# Patient Record
Sex: MALE | Race: WHITE | NOT HISPANIC OR LATINO | Employment: FULL TIME | URBAN - METROPOLITAN AREA
[De-identification: names, ages, dates, MRNs, and addresses within clinical notes are randomized per-mention and may not be internally consistent; named-entity substitution may affect disease eponyms.]

---

## 2022-12-27 ENCOUNTER — HOSPITAL ENCOUNTER (OUTPATIENT)
Dept: SLEEP MEDICINE | Facility: HOSPITAL | Age: 42
Discharge: HOME | End: 2022-12-27
Attending: PHYSICIAN ASSISTANT
Payer: COMMERCIAL

## 2022-12-27 DIAGNOSIS — G47.33 OBSTRUCTIVE SLEEP APNEA (ADULT) (PEDIATRIC): Primary | ICD-10-CM

## 2022-12-27 PROCEDURE — 95806 SLEEP STUDY UNATT&RESP EFFT: CPT

## 2022-12-29 PROBLEM — G47.33 OBSTRUCTIVE SLEEP APNEA (ADULT) (PEDIATRIC): Status: ACTIVE | Noted: 2022-12-29

## 2023-01-30 ENCOUNTER — HOSPITAL ENCOUNTER (OUTPATIENT)
Dept: SLEEP MEDICINE | Facility: HOSPITAL | Age: 43
Discharge: HOME | End: 2023-01-30
Attending: PHYSICIAN ASSISTANT
Payer: COMMERCIAL

## 2023-01-30 DIAGNOSIS — G47.33 OBSTRUCTIVE SLEEP APNEA (ADULT) (PEDIATRIC): Primary | ICD-10-CM

## 2023-01-30 PROCEDURE — 95811 POLYSOM 6/>YRS CPAP 4/> PARM: CPT

## 2024-08-26 ENCOUNTER — CLINICAL SUPPORT (OUTPATIENT)
Dept: FAMILY MEDICINE | Facility: CLINIC | Age: 44
End: 2024-08-26
Payer: COMMERCIAL

## 2024-08-26 ENCOUNTER — OFFICE VISIT (OUTPATIENT)
Dept: FAMILY MEDICINE | Facility: CLINIC | Age: 44
End: 2024-08-26
Payer: COMMERCIAL

## 2024-08-26 ENCOUNTER — TELEPHONE (OUTPATIENT)
Dept: FAMILY MEDICINE | Facility: CLINIC | Age: 44
End: 2024-08-26

## 2024-08-26 VITALS
HEIGHT: 67 IN | HEART RATE: 78 BPM | SYSTOLIC BLOOD PRESSURE: 118 MMHG | BODY MASS INDEX: 34.16 KG/M2 | RESPIRATION RATE: 20 BRPM | DIASTOLIC BLOOD PRESSURE: 74 MMHG | WEIGHT: 217.63 LBS | OXYGEN SATURATION: 95 %

## 2024-08-26 DIAGNOSIS — E78.2 MIXED HYPERLIPIDEMIA: ICD-10-CM

## 2024-08-26 DIAGNOSIS — Z00.00 PREVENTATIVE HEALTH CARE: ICD-10-CM

## 2024-08-26 DIAGNOSIS — R11.0 NAUSEA: ICD-10-CM

## 2024-08-26 DIAGNOSIS — E11.9 TYPE 2 DIABETES MELLITUS WITHOUT COMPLICATION, WITHOUT LONG-TERM CURRENT USE OF INSULIN: ICD-10-CM

## 2024-08-26 DIAGNOSIS — M54.2 CERVICALGIA: ICD-10-CM

## 2024-08-26 DIAGNOSIS — G47.33 OSA (OBSTRUCTIVE SLEEP APNEA): ICD-10-CM

## 2024-08-26 DIAGNOSIS — F32.A DEPRESSION, UNSPECIFIED DEPRESSION TYPE: ICD-10-CM

## 2024-08-26 DIAGNOSIS — F17.200 SMOKER: ICD-10-CM

## 2024-08-26 DIAGNOSIS — Z12.11 SCREEN FOR COLON CANCER: ICD-10-CM

## 2024-08-26 DIAGNOSIS — M19.90 ARTHRITIS: ICD-10-CM

## 2024-08-26 DIAGNOSIS — I10 ESSENTIAL HYPERTENSION: ICD-10-CM

## 2024-08-26 DIAGNOSIS — I10 ESSENTIAL HYPERTENSION: Primary | ICD-10-CM

## 2024-08-26 LAB
25(OH)D3+25(OH)D2 SERPL-MCNC: 24 NG/ML (ref 30–96)
ALBUMIN SERPL BCP-MCNC: 4.1 G/DL (ref 3.5–5.2)
ALP SERPL-CCNC: 77 U/L (ref 55–135)
ALT SERPL W/O P-5'-P-CCNC: 55 U/L (ref 10–44)
ANION GAP SERPL CALC-SCNC: 9 MMOL/L (ref 8–16)
AST SERPL-CCNC: 28 U/L (ref 10–40)
BASOPHILS # BLD AUTO: 0.09 K/UL (ref 0–0.2)
BASOPHILS NFR BLD: 1.2 % (ref 0–1.9)
BILIRUB SERPL-MCNC: 0.5 MG/DL (ref 0.1–1)
BUN SERPL-MCNC: 14 MG/DL (ref 6–20)
CALCIUM SERPL-MCNC: 9.2 MG/DL (ref 8.7–10.5)
CHLORIDE SERPL-SCNC: 107 MMOL/L (ref 95–110)
CHOLEST SERPL-MCNC: 210 MG/DL (ref 120–199)
CHOLEST/HDLC SERPL: 5 {RATIO} (ref 2–5)
CO2 SERPL-SCNC: 24 MMOL/L (ref 23–29)
CREAT SERPL-MCNC: 0.9 MG/DL (ref 0.5–1.4)
DIFFERENTIAL METHOD BLD: ABNORMAL
EOSINOPHIL # BLD AUTO: 0.6 K/UL (ref 0–0.5)
EOSINOPHIL NFR BLD: 7.1 % (ref 0–8)
ERYTHROCYTE [DISTWIDTH] IN BLOOD BY AUTOMATED COUNT: 13.2 % (ref 11.5–14.5)
EST. GFR  (NO RACE VARIABLE): >60 ML/MIN/1.73 M^2
ESTIMATED AVG GLUCOSE: 151 MG/DL (ref 68–131)
GLUCOSE SERPL-MCNC: 128 MG/DL (ref 70–110)
HBA1C MFR BLD: 6.9 % (ref 4–5.6)
HCT VFR BLD AUTO: 45 % (ref 40–54)
HDLC SERPL-MCNC: 42 MG/DL (ref 40–75)
HDLC SERPL: 20 % (ref 20–50)
HGB BLD-MCNC: 15.5 G/DL (ref 14–18)
IMM GRANULOCYTES # BLD AUTO: 0.02 K/UL (ref 0–0.04)
IMM GRANULOCYTES NFR BLD AUTO: 0.3 % (ref 0–0.5)
LDLC SERPL CALC-MCNC: 138.2 MG/DL (ref 63–159)
LYMPHOCYTES # BLD AUTO: 2.8 K/UL (ref 1–4.8)
LYMPHOCYTES NFR BLD: 36.4 % (ref 18–48)
MCH RBC QN AUTO: 31.6 PG (ref 27–31)
MCHC RBC AUTO-ENTMCNC: 34.4 G/DL (ref 32–36)
MCV RBC AUTO: 92 FL (ref 82–98)
MONOCYTES # BLD AUTO: 0.6 K/UL (ref 0.3–1)
MONOCYTES NFR BLD: 7.4 % (ref 4–15)
NEUTROPHILS # BLD AUTO: 3.7 K/UL (ref 1.8–7.7)
NEUTROPHILS NFR BLD: 47.6 % (ref 38–73)
NONHDLC SERPL-MCNC: 168 MG/DL
NRBC BLD-RTO: 0 /100 WBC
PLATELET # BLD AUTO: 214 K/UL (ref 150–450)
PMV BLD AUTO: 9.9 FL (ref 9.2–12.9)
POTASSIUM SERPL-SCNC: 4.3 MMOL/L (ref 3.5–5.1)
PROT SERPL-MCNC: 7 G/DL (ref 6–8.4)
RBC # BLD AUTO: 4.91 M/UL (ref 4.6–6.2)
SODIUM SERPL-SCNC: 140 MMOL/L (ref 136–145)
TRIGL SERPL-MCNC: 149 MG/DL (ref 30–150)
TSH SERPL DL<=0.005 MIU/L-ACNC: 1.03 UIU/ML (ref 0.4–4)
WBC # BLD AUTO: 7.72 K/UL (ref 3.9–12.7)

## 2024-08-26 PROCEDURE — 1159F MED LIST DOCD IN RCRD: CPT | Mod: CPTII,S$GLB,, | Performed by: FAMILY MEDICINE

## 2024-08-26 PROCEDURE — 4010F ACE/ARB THERAPY RXD/TAKEN: CPT | Mod: CPTII,S$GLB,, | Performed by: FAMILY MEDICINE

## 2024-08-26 PROCEDURE — 84443 ASSAY THYROID STIM HORMONE: CPT | Performed by: FAMILY MEDICINE

## 2024-08-26 PROCEDURE — 3008F BODY MASS INDEX DOCD: CPT | Mod: CPTII,S$GLB,, | Performed by: FAMILY MEDICINE

## 2024-08-26 PROCEDURE — 1160F RVW MEDS BY RX/DR IN RCRD: CPT | Mod: CPTII,S$GLB,, | Performed by: FAMILY MEDICINE

## 2024-08-26 PROCEDURE — 36415 COLL VENOUS BLD VENIPUNCTURE: CPT | Mod: S$GLB,,, | Performed by: FAMILY MEDICINE

## 2024-08-26 PROCEDURE — 83036 HEMOGLOBIN GLYCOSYLATED A1C: CPT | Performed by: FAMILY MEDICINE

## 2024-08-26 PROCEDURE — 82306 VITAMIN D 25 HYDROXY: CPT | Performed by: FAMILY MEDICINE

## 2024-08-26 PROCEDURE — 80061 LIPID PANEL: CPT | Performed by: FAMILY MEDICINE

## 2024-08-26 PROCEDURE — 99999 PR PBB SHADOW E&M-EST. PATIENT-LVL IV: CPT | Mod: PBBFAC,,, | Performed by: FAMILY MEDICINE

## 2024-08-26 PROCEDURE — 99204 OFFICE O/P NEW MOD 45 MIN: CPT | Mod: S$GLB,,, | Performed by: FAMILY MEDICINE

## 2024-08-26 PROCEDURE — 85025 COMPLETE CBC W/AUTO DIFF WBC: CPT | Performed by: FAMILY MEDICINE

## 2024-08-26 PROCEDURE — 3078F DIAST BP <80 MM HG: CPT | Mod: CPTII,S$GLB,, | Performed by: FAMILY MEDICINE

## 2024-08-26 PROCEDURE — G2211 COMPLEX E/M VISIT ADD ON: HCPCS | Mod: S$GLB,,, | Performed by: FAMILY MEDICINE

## 2024-08-26 PROCEDURE — 80053 COMPREHEN METABOLIC PANEL: CPT | Performed by: FAMILY MEDICINE

## 2024-08-26 PROCEDURE — 3074F SYST BP LT 130 MM HG: CPT | Mod: CPTII,S$GLB,, | Performed by: FAMILY MEDICINE

## 2024-08-26 RX ORDER — PANTOPRAZOLE SODIUM 40 MG/1
40 TABLET, DELAYED RELEASE ORAL DAILY
Qty: 30 TABLET | Refills: 5 | Status: SHIPPED | OUTPATIENT
Start: 2024-08-26 | End: 2025-08-26

## 2024-08-26 RX ORDER — FLUOXETINE HYDROCHLORIDE 20 MG/1
1 CAPSULE ORAL DAILY
COMMUNITY
Start: 2022-07-12

## 2024-08-26 RX ORDER — LISINOPRIL 20 MG/1
1 TABLET ORAL DAILY
COMMUNITY
Start: 2022-07-12

## 2024-08-26 RX ORDER — ONDANSETRON 4 MG/1
TABLET, ORALLY DISINTEGRATING ORAL
COMMUNITY

## 2024-08-26 RX ORDER — ROSUVASTATIN CALCIUM 20 MG/1
1 TABLET, COATED ORAL NIGHTLY
COMMUNITY
Start: 2023-04-26 | End: 2024-08-26 | Stop reason: SDUPTHER

## 2024-08-26 RX ORDER — DICLOFENAC SODIUM 75 MG/1
75 TABLET, DELAYED RELEASE ORAL 2 TIMES DAILY
Qty: 60 TABLET | Refills: 1 | Status: SHIPPED | OUTPATIENT
Start: 2024-08-26

## 2024-08-26 RX ORDER — AMLODIPINE BESYLATE 2.5 MG/1
1 TABLET ORAL DAILY
COMMUNITY

## 2024-08-26 RX ORDER — ROSUVASTATIN CALCIUM 20 MG/1
20 TABLET, COATED ORAL NIGHTLY
Qty: 90 TABLET | Refills: 1 | Status: SHIPPED | OUTPATIENT
Start: 2024-08-26

## 2024-08-26 NOTE — PROGRESS NOTES
"Subjective:       Patient ID: Dimitrios Cazares is a 44 y.o. male.    Chief Complaint: Establish Care (Pt here to [facility]. )    Pt is a 44 y.o. male who presents for evaluation and management of   Encounter Diagnoses   Name Primary?    Essential hypertension Yes    Type 2 diabetes mellitus without complication, without long-term current use of insulin     Mixed hyperlipidemia     Depression, unspecified depression type     Smoker     Preventative health care     Nausea     ANDRE (obstructive sleep apnea)     Arthritis    .  Doing well on current meds. Denies any side effects. Prevention is up to date.  Review of Systems   Constitutional:  Negative for fever.   HENT:  Positive for postnasal drip.    Respiratory:  Positive for apnea. Negative for shortness of breath.         Has auto cpap (this is new) but is having issues and is still snoring on it   He previously had a bipap with pressure of 13/5      Cardiovascular:  Negative for chest pain.   Gastrointestinal:  Positive for nausea. Negative for anal bleeding and blood in stool.        Nausea for about a week   HAs some "indigestion" better with TUMS      Genitourinary:  Negative for dysuria.   Musculoskeletal:  Positive for arthralgias and neck pain.        Bilateral knee pain   Comes and goes     Bilateral groin pain intermittently     C3/4 and C5/6 disc disease on MRI per pt.      Neurological:  Positive for numbness and headaches. Negative for dizziness and light-headedness.        CTS bilaterally---- braces at night help      Psychiatric/Behavioral:  Negative for dysphoric mood and sleep disturbance. The patient is not nervous/anxious.        Objective:      Physical Exam  Constitutional:       Appearance: Normal appearance. He is well-developed. He is obese. He is not ill-appearing.   HENT:      Head: Normocephalic and atraumatic.      Right Ear: External ear normal.      Left Ear: External ear normal.      Nose: Nose normal.      Mouth/Throat:      Mouth: " Mucous membranes are moist.      Pharynx: No oropharyngeal exudate or posterior oropharyngeal erythema.   Eyes:      General: No scleral icterus.        Right eye: No discharge.         Left eye: No discharge.      Conjunctiva/sclera: Conjunctivae normal.      Pupils: Pupils are equal, round, and reactive to light.   Neck:      Thyroid: No thyromegaly.      Vascular: No JVD.      Trachea: No tracheal deviation.   Cardiovascular:      Rate and Rhythm: Normal rate and regular rhythm.      Heart sounds: Normal heart sounds. No murmur heard.  Pulmonary:      Effort: Pulmonary effort is normal. No respiratory distress.      Breath sounds: Normal breath sounds. No wheezing or rales.   Chest:      Chest wall: No tenderness.   Abdominal:      General: Bowel sounds are normal. There is no distension.      Palpations: Abdomen is soft. There is no mass.      Tenderness: There is no abdominal tenderness. There is no guarding or rebound.   Genitourinary:     Comments: Has an early left inguinal hernia...   Musculoskeletal:         General: Normal range of motion.      Cervical back: Normal range of motion and neck supple.      Right lower leg: No edema.      Left lower leg: No edema.   Lymphadenopathy:      Cervical: No cervical adenopathy.   Skin:     General: Skin is warm and dry.   Neurological:      Mental Status: He is alert and oriented to person, place, and time.      Cranial Nerves: No cranial nerve deficit.      Motor: No abnormal muscle tone.      Coordination: Coordination normal.      Deep Tendon Reflexes: Reflexes are normal and symmetric. Reflexes normal.   Psychiatric:         Behavior: Behavior normal.         Thought Content: Thought content normal.         Judgment: Judgment normal.         Assessment:       1. Essential hypertension    2. Type 2 diabetes mellitus without complication, without long-term current use of insulin    3. Mixed hyperlipidemia    4. Depression, unspecified depression type    5. Smoker     6. Preventative health care    7. Nausea    8. ANDRE (obstructive sleep apnea)    9. Arthritis        Plan:   1. Essential hypertension  -     CBC Auto Differential; Future; Expected date: 08/26/2024  -     Comprehensive Metabolic Panel; Future; Expected date: 08/26/2024  -     TSH; Future; Expected date: 08/26/2024    2. Type 2 diabetes mellitus without complication, without long-term current use of insulin  -     rosuvastatin (CRESTOR) 20 MG tablet; Take 1 tablet (20 mg total) by mouth every evening.  Dispense: 90 tablet; Refill: 1  -     Hemoglobin A1C; Future; Expected date: 08/26/2024  -     TSH; Future; Expected date: 08/26/2024    3. Mixed hyperlipidemia  -     rosuvastatin (CRESTOR) 20 MG tablet; Take 1 tablet (20 mg total) by mouth every evening.  Dispense: 90 tablet; Refill: 1  -     Lipid Panel; Future; Expected date: 08/26/2024  -     TSH; Future; Expected date: 08/26/2024    4. Depression, unspecified depression type  Overview:  Mostly anger   Prozac working well         5. Smoker  Overview:  Smoked since about 19   Quit, sort of, a few years ago, but he still smokes about 3 times a week. 1 pack lasts him about 2 weeks             6. Preventative health care  -     Vitamin D; Future; Expected date: 08/26/2024    7. Nausea  -     pantoprazole (PROTONIX) 40 MG tablet; Take 1 tablet (40 mg total) by mouth once daily.  Dispense: 30 tablet; Refill: 5    8. ANDRE (obstructive sleep apnea)  -     CPAP FOR HOME USE    9. Arthritis  -     diclofenac (VOLTAREN) 75 MG EC tablet; Take 1 tablet (75 mg total) by mouth 2 (two) times daily.  Dispense: 60 tablet; Refill: 1      Colonoscopy---This procedure has been fully reviewed with the patient and written informed consent has been obtained.     Adding PPI     RTC 3-4 weeks       No follow-ups on file.

## 2024-08-26 NOTE — TELEPHONE ENCOUNTER
Cpap order with recommended settings and clinical notes faxed to [facility] per pt's request at [phone]. Fax confirmation received.

## 2024-08-27 DIAGNOSIS — E11.9 TYPE 2 DIABETES MELLITUS WITHOUT COMPLICATION, WITHOUT LONG-TERM CURRENT USE OF INSULIN: Primary | ICD-10-CM

## 2024-08-27 RX ORDER — LANCETS
EACH MISCELLANEOUS
Qty: 100 EACH | Refills: 11 | Status: SHIPPED | OUTPATIENT
Start: 2024-08-27

## 2024-08-27 RX ORDER — METFORMIN HYDROCHLORIDE 500 MG/1
500 TABLET ORAL
Qty: 30 TABLET | Refills: 5 | Status: SHIPPED | OUTPATIENT
Start: 2024-08-27 | End: 2025-08-27

## 2024-08-27 RX ORDER — INSULIN PUMP SYRINGE, 3 ML
EACH MISCELLANEOUS
Qty: 1 EACH | Refills: 0 | Status: SHIPPED | OUTPATIENT
Start: 2024-08-27

## 2024-08-27 NOTE — PROGRESS NOTES
Start vit D OTC [id number] IUS daily for low vit D   Im starting metformin 500mg daily for his DM   I am also referring him for DM education for new diabetes   Will get him a glucometer, but Ihe does not have to check his blood sugar daily. Only a couple of times a week to see where he is and prn hypoglycemia symptoms

## 2024-08-29 ENCOUNTER — HOSPITAL ENCOUNTER (OUTPATIENT)
Dept: RADIOLOGY | Facility: HOSPITAL | Age: 44
Discharge: HOME | End: 2024-08-29
Attending: ANESTHESIOLOGY
Payer: COMMERCIAL

## 2024-08-29 ENCOUNTER — OFFICE VISIT (OUTPATIENT)
Dept: PAIN MEDICINE | Facility: CLINIC | Age: 44
End: 2024-08-29
Payer: COMMERCIAL

## 2024-08-29 DIAGNOSIS — M54.2 CHRONIC NECK PAIN: ICD-10-CM

## 2024-08-29 DIAGNOSIS — M54.2 CERVICALGIA: ICD-10-CM

## 2024-08-29 DIAGNOSIS — G89.29 CHRONIC NECK PAIN: ICD-10-CM

## 2024-08-29 DIAGNOSIS — M47.812 ARTHROPATHY OF CERVICAL FACET JOINT: ICD-10-CM

## 2024-08-29 DIAGNOSIS — M25.78 DEGENERATION OF INTERVERTEBRAL DISC OF CERVICAL REGION WITH OSTEOPHYTE OF CERVICAL VERTEBRA: ICD-10-CM

## 2024-08-29 DIAGNOSIS — M54.12 CERVICAL RADICULITIS: Primary | ICD-10-CM

## 2024-08-29 DIAGNOSIS — M50.30 DEGENERATION OF INTERVERTEBRAL DISC OF CERVICAL REGION WITH OSTEOPHYTE OF CERVICAL VERTEBRA: ICD-10-CM

## 2024-08-29 PROCEDURE — 72050 X-RAY EXAM NECK SPINE 4/5VWS: CPT | Mod: 26,,, | Performed by: RADIOLOGY

## 2024-08-29 PROCEDURE — 99999 PR PBB SHADOW E&M-EST. PATIENT-LVL III: CPT | Mod: PBBFAC,,, | Performed by: ANESTHESIOLOGY

## 2024-08-29 PROCEDURE — 72050 X-RAY EXAM NECK SPINE 4/5VWS: CPT | Mod: TC

## 2024-08-29 RX ORDER — GABAPENTIN 300 MG/1
300 CAPSULE ORAL 3 TIMES DAILY
Qty: 90 CAPSULE | Refills: 0 | Status: SHIPPED | OUTPATIENT
Start: 2024-08-29

## 2024-08-29 NOTE — PROGRESS NOTES
New patient evaluation  [facility]    Dimitrios Cazares  : 1980  Date: 2024     CHIEF COMPLAINT:  No chief complaint on file.    Referring Physician: Self, Aaareferral  Primary Care Physician: Geronimo Vera MD    HPI:  This is a 44 y.o. male with a chief complaint of No chief complaint on file.  . The patient has Past medical history/Past surgical history of ***    Patient was evaluated and referred by ***    Diabetic: {GAYes/No/NA:58903}    {Anticogualtion drugs:44370}    Allergy To Iodine: {GAYes/No/NA:09128}    Currently on Antibiotic: {GAYes/No/NA:53618}    Current Description of Pain Symptoms:    History of Recent Fall or Trauma: {GAYes/No/NA:31677}   Onset: Chronic, started ***  Pain Location: ***  Radiates/associated symptoms: ***.   Pain is Getting worse over the last *** months    The pain is described as {Desc; pain character:66042}.   Exacerbating factors: {Causes; Pain:07577}.   Mitigating factors ***.   Symptoms interfere with daily activity, sleeping, and ***.   The patient feels like symptoms have been {IUW:77896}.   Patient {Denies / Reports:99650} {RED FLAGS:09876}.    Pain score:   Current: {PAIN 0-10:98988}/10  Best: {PAIN 0-10:44896}/10  Worst: {PAIN 0-10:93796}/10    Current pain medications:    Current Outpatient Medications:     albuterol (PROVENTIL) 2.5 mg /3 mL (0.083 %) nebulizer solution, daily as needed. Pt taking prn, Disp: , Rfl: 0    amLODIPine (NORVASC) 2.5 MG tablet, Take 1 tablet by mouth once daily., Disp: , Rfl:     blood sugar diagnostic (BLOOD GLUCOSE TEST) Strp, Check blood sugar daily prn, Disp: 60 strip, Rfl: 11    blood-glucose meter kit, Check blood sugar daily prn, Disp: 1 each, Rfl: 0    diclofenac (VOLTAREN) 75 MG EC tablet, Take 1 tablet (75 mg total) by mouth 2 (two) times daily., Disp: 60 tablet, Rfl: 1    FLUoxetine 20 MG capsule, Take 1 capsule by mouth once daily., Disp: , Rfl:     lancets Misc, Check blood sugar  daily prn, Disp: 100 each, Rfl: 11    lisinopriL (PRINIVIL,ZESTRIL) 20 MG tablet, Take 1 tablet by mouth once daily., Disp: , Rfl:     metFORMIN (GLUCOPHAGE) 500 MG tablet, Take 1 tablet (500 mg total) by mouth daily with breakfast., Disp: 30 tablet, Rfl: 5    ondansetron (ZOFRAN-ODT) 4 MG TbDL, , Disp: , Rfl:     pantoprazole (PROTONIX) 40 MG tablet, Take 1 tablet (40 mg total) by mouth once daily., Disp: 30 tablet, Rfl: 5    rosuvastatin (CRESTOR) 20 MG tablet, Take 1 tablet (20 mg total) by mouth every evening., Disp: 90 tablet, Rfl: 1    Current Narcotics/Opioid /benzo Medications:  Opioids- {GAopioid:61773}  Benzodiazepines: {GAYes/No/NA:41282}    UDS:  NA    PDMP:  {:34194}     Previous Chronic Pain Treatment History:  Physical Therapy/HEP/Physician Lead Exercise Program:  Over the past 12 months, Patient has done  *** sessions.  PT response: {PT response:18289} Helpful.   Dates of the PT sessions: ***, ***.  Is patient participating in home exercise program (HEP)/ physician led exercise program: {GAYes/No/NA:12485}.    Non-interventional Pain Therapy:  []Chiropractor.   []Acupuncture/Dry needle.  []TENS unit.  []Heat/ICE.  []Back Brace.    Medications previously tried:  NSAIDs: {GANSIAD:11761}  Topical Agent: {GAYes/No/NA:09377}  TCA/SSRI/SNRI: {GATCA/SSRI/SNRI:84214}  Anti-convulsants: {GAAnticonvulsants:53100}  Muscle Relaxants: {GAmuscle Relaxant:36960}  Opioids- {GAopioid:85721}.    Interventional Pain Procedures:  ***    Previous spine/Relevant joint surgery:  ***  Surgical History:   has a past surgical history that includes Gallbladder surgery (08/20/2019); Hernia repair (2003); Abdominal hernia repair (08/20/2019); and Pace tooth extraction (1997).  Medical History:   has a past medical history of Carpal tunnel syndrome on both sides and Hypertension.  Family History:  family history is not on file.  Allergies:  Aspirin   Social History/SUBSTANCE ABUSE HISTORY:  Personal history of substance  "abuse: No   reports that he has been smoking cigarettes. He has been exposed to tobacco smoke. He has never used smokeless tobacco. He reports current alcohol use. He reports that he does not use drugs.  LABS:  CBC  Lab Results   Component Value Date    WBC 7.72 08/26/2024    HGB 15.5 08/26/2024    HCT 45.0 08/26/2024     Coagulation Profile   Lab Results   Component Value Date     08/26/2024     No results found for: "PT", "PTT", "INR"  CMP:  BMP  Lab Results   Component Value Date     08/26/2024    K 4.3 08/26/2024     08/26/2024    CO2 24 08/26/2024    BUN 14 08/26/2024    CREATININE 0.9 08/26/2024    CALCIUM 9.2 08/26/2024    ANIONGAP 9 08/26/2024    EGFRNORACEVR >60 08/26/2024     Lab Results   Component Value Date    ALT 55 (H) 08/26/2024    AST 28 08/26/2024    ALKPHOS 77 08/26/2024    BILITOT 0.5 08/26/2024     HGBA1C:  Lab Results   Component Value Date    HGBA1C 6.9 (H) 08/26/2024       ROS:    Review of Systems   GENERAL:  No weight loss, malaise or fevers.  HEENT:   No recent changes in vision or hearing  NECK:  Negative for lumps, no difficulty with swallowing.  RESPIRATORY:  Negative for cough, wheezing or shortness of breath, patient denies any recent URI.  CARDIOVASCULAR:  Negative for chest pain or palpitations.  GI:  Negative for abdominal discomfort, blood in stools or black stools or change in bowel habits.  MUSCULOSKELETAL:  See HPI.  SKIN:  Negative for lesions, rash, and itching.  PSYCH:  No mood disorder or recent psychosocial stressors.   HEMATOLOGY/LYMPHOLOGY:  See the blood thinner sectioned in HPI.  NEURO:  See HPI  All other reviewed and negative other than HPI.    PHYSICAL EXAM:  VITALS: There were no vitals taken for this visit.  There is no height or weight on file to calculate BMI.  GENERAL: Well appearing, in no acute distress, alert and oriented x3, answers questions appropriately.   PSYCH: Flat affect.  SKIN: Skin color, texture, turgor normal, no rashes or " lesions.  HEAD/FACE:  Normocephalic, atraumatic. Cranial nerves grossly intact.  CV: Regular rate  PULM: No evidence of respiratory difficulty, symmetric chest rise.  GI:  Soft and non-Distended.  NECK: (***) pain to palpation over the cervical paraspinous muscles. Spurling: ***. (***) pain with neck flexion, extension, or lateral flexion, Muscle strength in RT UE ***/5 and Left UE ***/5, Hand  5/5 bilaterally   BACK/SIJ/HIP:  Lumbar Spine Exam:       Inspection: No erythema, bruising.       Palpation: (***) TTP of lumbar paraspinals bilaterally      ROM:  Limited in flexion, extension, lateral bending.       (***) Facet loading bilaterally      (***) Straight Leg Raise bilaterally      (***) ILANA bilaterally, Tenderness over the PSIS, Yeoman test  Hip Exam:      Inspection: No gross deformity or apparent leg length discrepancy      Palpation:  No TTP to bilateral greater trochanteric bursas.       ROM:  No limitation or pain in internal rotation, external rotation b/l  Neurologic Exam:     Alert. Speech is fluent and appropriate.     Strength: ***/5 in *** hip flexion and knee extension     Sensation:  Grossly intact to light touch in bilateral lower extremities     Tone: No abnormality appreciated in bilateral lower extremities  Knee exam:  No gross deformity or apparent leg length discrepancy, positive tenderness over the anteromedial aspect of the knee cap, positive limitation due to pain in flexion and extension, sensation caudal grossly intact to light touch in bilateral lower extremity, no atrophy or tone abnormalities    GAIT: ***    DIAGNOSTIC STUDIES AND MEDICAL RECORDS REVIEW:  I have personally reviewed and interpreted relevant radiology reports and reviewed relevant records from other services in the EMR.   ***  Clinical Impression:  This is a pleasant 44 y.o. male patient with PMH/PSH of ***, presenting with***.     We discussed the underlying diagnoses and multiple treatment options including  "non-opioid medications, interventional procedures, physical therapy, home exercise, core muscle enhancement, and weight loss.  The risks and benefits of each treatment option were discussed and all questions were answered.      Encounter Diagnosis:  Dimitrios Cazares is a 44 y.o. male with the following diagnoses based on history, exam, and imaging:  There are no diagnoses linked to this encounter.   ---------------------------------------------------------------------      Treatment Plan:    Diagnostics/Referrals: {gaimage:19651}    Medications:    NSAIDs: {GANSIAD:60162}  Topical Agent: {GAYes/No/NA:97610}  TCA/SSRI/SNRI: {GATCA/SSRI/SNRI:57330}  Anti-convulsants: {GAAnticonvulsants:12450}  Muscle Relaxants: {GAmuscle Relaxant:30559}  Opioids: {GAopioid:37137::"None"}  Patient was educated about the risk and benefit of chronic opioid therapy including dependency, addiction, diversion, and opioid hyperalgesia.  Interventional Therapy: {GAProcedure:97372}.  Sedation: {GAsedation:10160}.   {Anticogualtion drugs:76372}-Clearance to stop Blood thinner:***     Regarding the above interventions, the patient has been educated regarding the risks (including bleeding, infection, increased pain, nerve damage, or allergic reaction), benefits, and alternatives. The patient states he understands and is eager to proceed.    Physical Rehabilitation: {GAPT:73364}    Patient Education: Counseled patient regarding the importance of {:96749}, I have stressed the importance of physical activity and a home exercise plan to help with pain and improve health.    Follow-up: RTC ***.    May consider:       Corbin Pickering MD  Anesthesiologist  Interventional Pain Medicine  08/29/2024    Disclaimer:  This note was prepared using voice recognition system and is likely to have sound alike errors that may have been overlooked even after proof reading.  Please call me with any questions.  "

## 2024-08-29 NOTE — PROGRESS NOTES
New patient evaluation  [facility]    Dimitrios Cazares  : 1980  Date: 2024     CHIEF COMPLAINT:  Neck Pain    Referring Physician: Self, Aaareferral  Primary Care Physician: Geronimo Vera MD    HPI:  This is a 44 y.o. male with a chief complaint of Neck Pain  . The patient has Past medical history/Past surgical history of  hypertension, hyperlipidemia, diabetes, sleep apnea, smoker    Patient was evaluated and referred by PCP for Neck pain.  He had previous cervical epidural steroid injection outside [facility] with moderate improvement in pain and functionality in .  MRI cervical spine was completed  outside [facility].    Diabetic: Yes    Anticogualtion drugs: None    Allergy To Iodine: No    Currently on Antibiotic: No    Current Description of Pain Symptoms:    History of Recent Fall or Trauma: No   Onset: Chronic, started 1 yr  Pain Location: neck and CH  Radiates/associated symptoms: bilat shoulder blades, no significant bilateral upper extremity radiation   Bilateral  hand numbness due to carpal tunnel  Pain is Getting worse over the last 3 months    The pain is described as aching, burning, sharp, shooting, stabbing, and throbbing.   Exacerbating factors:  neck extension  Mitigating factors none.   Symptoms interfere with daily activity, sleeping.   The patient feels like symptoms have been worsening.   Patient denies night fever/night sweats, urinary incontinence, bowel incontinence, significant weight loss, significant motor weakness, and loss of sensations.    Pain score:  Current: 2/10  Best: 0/10  Worst: 7/10    Current pain medications:      diclofenac (VOLTAREN) 75 MG EC tablet BID    FLUoxetine 20 MG capsule daily     Current Narcotics/Opioid /benzo Medications:  Opioids- None  Benzodiazepines: No    UDS:  NA    PDMP:  Reviewed and consistent with medication use as prescribed.     Previous Chronic Pain Treatment History:  Physical Therapy/HEP/Physician  "Lead Exercise Program:  Over the past 12 months, Patient has done  6+ sessions.  PT response: Moderately Helpful.   Dates of the PT sessions: 2023.  Is patient participating in home exercise program (HEP)/ physician led exercise program: Yes.    Non-interventional Pain Therapy:  []Chiropractor.   [x]Dry needle: helps  []TENS unit.  []Heat/ICE.  []Back Brace.    Medications previously tried:  NSAIDs: Diclofenac  Topical Agent: Yes  TCA/SSRI/SNRI: SSRI: Fluoxetine (Prozac)  Anti-convulsants: None  Muscle Relaxants: Flexeril (Cyclobenzaprine)  Opioids- None.    Interventional Pain Procedures:  SHIRA at C7-T1 2022 [facility] made the pain tolerable     Previous spine/Relevant joint surgery:  none  Surgical History:   has a past surgical history that includes Gallbladder surgery (08/20/2019); Hernia repair (2003); Abdominal hernia repair (08/20/2019); and [place] tooth extraction (1997).  Medical History:   has a past medical history of Carpal tunnel syndrome on both sides and Hypertension.  Family History:  family history is not on file.  Allergies:  Aspirin   Social History/SUBSTANCE ABUSE HISTORY:  Personal history of substance abuse: No   reports that he has been smoking cigarettes. He has been exposed to tobacco smoke. He has never used smokeless tobacco. He reports current alcohol use. He reports that he does not use drugs.  LABS:  CBC  Lab Results   Component Value Date    WBC 7.72 08/26/2024    HGB 15.5 08/26/2024    HCT 45.0 08/26/2024     Coagulation Profile   Lab Results   Component Value Date     08/26/2024     No results found for: "PT", "PTT", "INR"  CMP:  BMP  Lab Results   Component Value Date     08/26/2024    K 4.3 08/26/2024     08/26/2024    CO2 24 08/26/2024    BUN 14 08/26/2024    CREATININE 0.9 08/26/2024    CALCIUM 9.2 08/26/2024    ANIONGAP 9 08/26/2024    EGFRNORACEVR >60 08/26/2024     Lab Results   Component Value Date    ALT 55 (H) 08/26/2024    AST 28 08/26/2024    ALKPHOS " 77 08/26/2024    BILITOT 0.5 08/26/2024     HGBA1C:  Lab Results   Component Value Date    HGBA1C 6.9 (H) 08/26/2024       ROS:    Review of Systems   GENERAL:  No weight loss, malaise or fevers.  HEENT:   No recent changes in vision or hearing  NECK:  Negative for lumps, no difficulty with swallowing.  RESPIRATORY:  Negative for cough, wheezing or shortness of breath, patient denies any recent URI.  CARDIOVASCULAR:  Negative for chest pain or palpitations.  GI:  Negative for abdominal discomfort, blood in stools or black stools or change in bowel habits.  MUSCULOSKELETAL:  See HPI.  SKIN:  Negative for lesions, rash, and itching.  PSYCH:  No mood disorder or recent psychosocial stressors.   HEMATOLOGY/LYMPHOLOGY:  See the blood thinner sectioned in HPI.  NEURO:  See HPI  All other reviewed and negative other than HPI.    PHYSICAL EXAM:  VITALS: There were no vitals taken for this visit.  There is no height or weight on file to calculate BMI.  GENERAL: Well appearing, in no acute distress, alert and oriented x3, answers questions appropriately.   PSYCH: Flat affect.  SKIN: Skin color, texture, turgor normal, no rashes or lesions.  HEAD/FACE:  Normocephalic, atraumatic. Cranial nerves grossly intact.  CV: Regular rate  PULM: No evidence of respiratory difficulty, symmetric chest rise.  GI:  Soft and non-Distended.  NECK: ( mild positive) pain to palpation over the cervical paraspinous muscles. Spurling: +. (+) pain with neck extension than flexion, Muscle strength in RT UE 5/5 and Left UE 5/5, Hand  5/5 bilaterally   SHOULDERS: No gross deformity or atrophy noted.   ROM: Full AROM bilaterally. No focal TTP   Rotator cuff exam NARANJO: Unremarkable bilaterally.   EMPTY CAN SIGN: Unremarkable bilaterally.   CROSS-ARM ADDUCTION: No significant AC joint pain bilaterally.  GAIT:   normal    DIAGNOSTIC STUDIES AND MEDICAL RECORDS REVIEW:  I have personally reviewed and interpreted relevant radiology reports and  reviewed relevant records from other services in the EMR.   X-ray cervical spine 2024  after office visit   Mild reversal the normal cervical lordosis. Vertebral body heights are maintained. No abnormal movement with flexion or extension to suggest ligamentous instability. There is disc space narrowing with endplate sclerosis and marginal osteophytosis at the C4-5, C5-6 and C6-7 levels. No evidence of lytic or blastic lesions. Soft tissues are unremarkable.     Clinical Impression:  This is a pleasant 44 y.o. male patient with PMH/PSH of hypertension, hyperlipidemia, diabetes, sleep apnea, smoker, presenting with Neck pain, cervicogenic headache, bilateral shoulder pain, no upper extremity radiculitis, no weakness, no bilateral shoulder pathology, has a MRI cervical spine outside [facility], had good pain relief from cervical epidural steroid injection ,  interested to proceed with injection,  he completed more than 6 weeks of physician led exercise program/ home exercise in the last 6 months.    Encounter Diagnosis:  Dimitrios Cazares is a 44 y.o. male with the following diagnoses based on history, exam, and imagin. Cervical radiculitis    2. Cervicalgia  - Ambulatory referral/consult to Pain Clinic  - X-Ray Cervical Spine AP Lat with Flex Ex; Future    3. Arthropathy of cervical facet joint    4. Degeneration of intervertebral disc of cervical region with osteophyte of cervical vertebra    5. Chronic neck pain  -------------------------------  Treatment Plan:    Diagnostics/Referrals:  records and MRI images from outside    Medications:    NSAIDs: Diclofenac  Topical Agent: NA  TCA/SSRI/SNRI: SSRI: Fluoxetine (Prozac)  Anti-convulsants:  start gabapentin 300 mg 3 times a day, titration, 30 days  Muscle Relaxants: None  Opioids: None    Interventional Therapy:  may consider proceeding with cervical epidural steroid injection after reviewing outside records.  Sedation: Oral Sedation.   Anticogualtion  drugs: None-Clearance to stop Blood thinner: NA     Regarding the above interventions, the patient has been educated regarding the risks (including bleeding, infection, increased pain, nerve damage, or allergic reaction), benefits, and alternatives. The patient states he understands and is eager to proceed.    Physical Rehabilitation: Physician led exercise program and home exercise    Patient Education: Counseled patient regarding the importance of activity modification, I have stressed the importance of physical activity and a home exercise plan to help with pain and improve health.    Follow-up: RTC  after injection.    May consider:   upper level cervical medial branch block.      Corbin Pickering MD  Anesthesiologist  Interventional Pain Medicine  08/29/2024    Disclaimer:  This note was prepared using voice recognition system and is likely to have sound alike errors that may have been overlooked even after proof reading.  Please call me with any questions.

## 2024-09-17 ENCOUNTER — TELEPHONE (OUTPATIENT)
Dept: PAIN MEDICINE | Facility: CLINIC | Age: 44
End: 2024-09-17
Payer: COMMERCIAL

## 2024-09-17 DIAGNOSIS — M54.12 CERVICAL RADICULITIS: Primary | ICD-10-CM

## 2024-09-17 NOTE — TELEPHONE ENCOUNTER
----- Message from Randa Bowling LPN sent at 9/16/2024  4:45 PM CDT -----    ----- Message -----  From: Lidia Oviedo RN  Sent: 9/16/2024   4:20 PM CDT  To: [facility] Pain Management Clinical Support      ----- Message -----  From: Corbin Pickering MD  Sent: 9/16/2024  11:15 AM CDT  To: [facility] Staff     Please schedule for cervical epidural steroid injection at C7-T1, oral sedation,  I need to see MRI cervical spine images before proceeding   Diabetic, not on blood thinners, oral sedation

## 2024-09-19 ENCOUNTER — OFFICE VISIT (OUTPATIENT)
Dept: FAMILY MEDICINE | Facility: CLINIC | Age: 44
End: 2024-09-19
Payer: COMMERCIAL

## 2024-09-19 VITALS
BODY MASS INDEX: 34.07 KG/M2 | HEART RATE: 84 BPM | RESPIRATION RATE: 18 BRPM | OXYGEN SATURATION: 96 % | SYSTOLIC BLOOD PRESSURE: 126 MMHG | HEIGHT: 67 IN | WEIGHT: 217.06 LBS | DIASTOLIC BLOOD PRESSURE: 80 MMHG

## 2024-09-19 DIAGNOSIS — G56.02 CARPAL TUNNEL SYNDROME OF LEFT WRIST: ICD-10-CM

## 2024-09-19 DIAGNOSIS — F32.A DEPRESSION, UNSPECIFIED DEPRESSION TYPE: ICD-10-CM

## 2024-09-19 DIAGNOSIS — R11.0 NAUSEA: ICD-10-CM

## 2024-09-19 DIAGNOSIS — I10 ESSENTIAL HYPERTENSION: Primary | ICD-10-CM

## 2024-09-19 DIAGNOSIS — E11.9 TYPE 2 DIABETES MELLITUS WITHOUT COMPLICATION, WITHOUT LONG-TERM CURRENT USE OF INSULIN: ICD-10-CM

## 2024-09-19 PROCEDURE — 99999 PR PBB SHADOW E&M-EST. PATIENT-LVL III: CPT | Mod: PBBFAC,,, | Performed by: FAMILY MEDICINE

## 2024-09-19 RX ORDER — METFORMIN HYDROCHLORIDE 500 MG/1
500 TABLET ORAL
Qty: 90 TABLET | Refills: 1 | Status: SHIPPED | OUTPATIENT
Start: 2024-09-19 | End: 2025-09-19

## 2024-09-19 RX ORDER — FLUOXETINE HYDROCHLORIDE 20 MG/1
20 CAPSULE ORAL DAILY
Qty: 90 CAPSULE | Refills: 2 | Status: SHIPPED | OUTPATIENT
Start: 2024-09-19

## 2024-09-19 RX ORDER — LISINOPRIL 20 MG/1
20 TABLET ORAL DAILY
Qty: 90 TABLET | Refills: 2 | Status: SHIPPED | OUTPATIENT
Start: 2024-09-19

## 2024-09-19 RX ORDER — TRIAMCINOLONE ACETONIDE 40 MG/ML
20 INJECTION, SUSPENSION INTRA-ARTICULAR; INTRAMUSCULAR
Status: COMPLETED | OUTPATIENT
Start: 2024-09-19 | End: 2024-09-19

## 2024-09-19 RX ORDER — PANTOPRAZOLE SODIUM 40 MG/1
40 TABLET, DELAYED RELEASE ORAL DAILY
Qty: 90 TABLET | Refills: 1 | Status: SHIPPED | OUTPATIENT
Start: 2024-09-19 | End: 2025-09-19

## 2024-09-19 RX ADMIN — TRIAMCINOLONE ACETONIDE 20 MG: 40 INJECTION, SUSPENSION INTRA-ARTICULAR; INTRAMUSCULAR at 01:09

## 2024-09-19 NOTE — PROGRESS NOTES
Subjective:       Patient ID: Dimitrios Cazares is a 44 y.o. male.    Chief Complaint: Follow-up (Pt here for f/u. )    Pt is a 44 y.o. male who presents for evaluation and management of   Encounter Diagnoses   Name Primary?    Type 2 diabetes mellitus without complication, without long-term current use of insulin     Nausea     Essential hypertension Yes    Depression, unspecified depression type     Carpal tunnel syndrome of left wrist    .nausea better with PPI. Resolved   Labs with new onset DM     Doing well on current meds. Denies any side effects. Prevention is up to date.  Review of Systems   Respiratory:  Negative for shortness of breath.    Cardiovascular:  Negative for chest pain.   Gastrointestinal:  Negative for abdominal pain and nausea.   Neurological:  Positive for numbness.        Bilateral CTS         Objective:      Physical Exam  Constitutional:       Appearance: Normal appearance. He is well-developed. He is not ill-appearing.   HENT:      Head: Normocephalic and atraumatic.      Right Ear: External ear normal.      Left Ear: External ear normal.      Nose: Nose normal.      Mouth/Throat:      Mouth: Mucous membranes are moist.      Pharynx: No oropharyngeal exudate or posterior oropharyngeal erythema.   Eyes:      General: No scleral icterus.        Right eye: No discharge.         Left eye: No discharge.      Conjunctiva/sclera: Conjunctivae normal.      Pupils: Pupils are equal, round, and reactive to light.   Neck:      Thyroid: No thyromegaly.      Vascular: No JVD.      Trachea: No tracheal deviation.   Cardiovascular:      Rate and Rhythm: Normal rate and regular rhythm.      Heart sounds: Normal heart sounds. No murmur heard.  Pulmonary:      Effort: Pulmonary effort is normal. No respiratory distress.      Breath sounds: Normal breath sounds. No wheezing or rales.   Chest:      Chest wall: No tenderness.   Abdominal:      General: Bowel sounds are normal. There is no distension.       Palpations: Abdomen is soft. There is no mass.      Tenderness: There is no abdominal tenderness. There is no guarding or rebound.   Musculoskeletal:         General: Normal range of motion.      Cervical back: Normal range of motion and neck supple.      Right lower leg: No edema.      Left lower leg: No edema.      Comments: Pos phalen      Lymphadenopathy:      Cervical: No cervical adenopathy.   Skin:     General: Skin is warm and dry.   Neurological:      Mental Status: He is alert and oriented to person, place, and time.      Cranial Nerves: No cranial nerve deficit.      Motor: No abnormal muscle tone.      Coordination: Coordination normal.      Deep Tendon Reflexes: Reflexes are normal and symmetric. Reflexes normal.   Psychiatric:         Behavior: Behavior normal.         Thought Content: Thought content normal.         Judgment: Judgment normal.         Assessment:       1. Essential hypertension    2. Type 2 diabetes mellitus without complication, without long-term current use of insulin    3. Nausea    4. Depression, unspecified depression type    5. Carpal tunnel syndrome of left wrist        Plan:   1. Essential hypertension  -     lisinopriL (PRINIVIL,ZESTRIL) 20 MG tablet; Take 1 tablet (20 mg total) by mouth once daily.  Dispense: 90 tablet; Refill: 2    2. Type 2 diabetes mellitus without complication, without long-term current use of insulin  -     metFORMIN (GLUCOPHAGE) 500 MG tablet; Take 1 tablet (500 mg total) by mouth daily with breakfast.  Dispense: 90 tablet; Refill: 1    3. Nausea  -     pantoprazole (PROTONIX) 40 MG tablet; Take 1 tablet (40 mg total) by mouth once daily.  Dispense: 90 tablet; Refill: 1    4. Depression, unspecified depression type  Overview:  Mostly anger   Prozac working well       Orders:  -     FLUoxetine 20 MG capsule; Take 1 capsule (20 mg total) by mouth once daily.  Dispense: 90 capsule; Refill: 2    5. Carpal tunnel syndrome of left wrist  -     triamcinolone  acetonide injection 20 mg     Area prepped and draped in sterile fashion. Using anterior approach, left wrist(carpal tunnel) injected with kenalog 60mg and lidocaine 1.5ml. Pt atif well with good relief    No follow-ups on file.

## 2024-09-25 ENCOUNTER — PATIENT MESSAGE (OUTPATIENT)
Dept: FAMILY MEDICINE | Facility: CLINIC | Age: 44
End: 2024-09-25
Payer: COMMERCIAL

## 2024-09-25 DIAGNOSIS — E11.9 TYPE 2 DIABETES MELLITUS WITHOUT COMPLICATION: ICD-10-CM

## 2024-09-25 DIAGNOSIS — E11.9 TYPE 2 DIABETES MELLITUS WITHOUT COMPLICATION, UNSPECIFIED WHETHER LONG TERM INSULIN USE: ICD-10-CM

## 2024-09-25 RX ORDER — AMLODIPINE BESYLATE 2.5 MG/1
2.5 TABLET ORAL DAILY
Qty: 90 TABLET | Refills: 2 | Status: SHIPPED | OUTPATIENT
Start: 2024-09-25

## 2024-09-25 NOTE — TELEPHONE ENCOUNTER
LOV 09/19/2024    patient requesting refill for amLODIPine (NORVASC) 2.5 MG tablet         RX pending   pharmacy confirmed  please advise

## 2024-09-25 NOTE — TELEPHONE ENCOUNTER
No care due was identified.  [facility] Embedded Care Due Messages. Reference number: [id number].   9/25/2024 9:39:37 AM CDT

## 2024-09-26 RX ORDER — AMLODIPINE BESYLATE 2.5 MG/1
2.5 TABLET ORAL DAILY
Qty: 90 TABLET | Refills: 2 | OUTPATIENT
Start: 2024-09-26

## 2024-09-26 NOTE — TELEPHONE ENCOUNTER
No care due was identified.  [facility] Embedded Care Due Messages. Reference number: [id number].   9/25/2024 7:45:32 PM CDT

## 2024-10-04 ENCOUNTER — HOSPITAL ENCOUNTER (OUTPATIENT)
Dept: RADIOLOGY | Facility: HOSPITAL | Age: 44
Discharge: HOME | End: 2024-10-04
Attending: ANESTHESIOLOGY | Admitting: ANESTHESIOLOGY
Payer: COMMERCIAL

## 2024-10-04 ENCOUNTER — HOSPITAL ENCOUNTER (OUTPATIENT)
Facility: HOSPITAL | Age: 44
Discharge: HOME | End: 2024-10-04
Attending: ANESTHESIOLOGY | Admitting: ANESTHESIOLOGY
Payer: COMMERCIAL

## 2024-10-04 VITALS
SYSTOLIC BLOOD PRESSURE: 120 MMHG | TEMPERATURE: 97 F | HEART RATE: 66 BPM | OXYGEN SATURATION: 95 % | DIASTOLIC BLOOD PRESSURE: 70 MMHG | RESPIRATION RATE: 16 BRPM

## 2024-10-04 DIAGNOSIS — M54.12 CERVICAL RADICULITIS: ICD-10-CM

## 2024-10-04 DIAGNOSIS — F41.9 ANXIETY: ICD-10-CM

## 2024-10-04 DIAGNOSIS — M54.12 CERVICAL RADICULOPATHY: Primary | ICD-10-CM

## 2024-10-04 DIAGNOSIS — G89.29 CHRONIC PAIN: ICD-10-CM

## 2024-10-04 PROCEDURE — 76000 FLUOROSCOPY <1 HR PHYS/QHP: CPT | Mod: TC

## 2024-10-04 PROCEDURE — 63600175 PHARM REV CODE 636 W HCPCS: Performed by: ANESTHESIOLOGY

## 2024-10-04 PROCEDURE — 25500020 PHARM REV CODE 255: Performed by: ANESTHESIOLOGY

## 2024-10-04 PROCEDURE — 62321 NJX INTERLAMINAR CRV/THRC: CPT | Performed by: ANESTHESIOLOGY

## 2024-10-04 PROCEDURE — 25000003 PHARM REV CODE 250: Performed by: ANESTHESIOLOGY

## 2024-10-04 PROCEDURE — 62321 NJX INTERLAMINAR CRV/THRC: CPT | Mod: ,,, | Performed by: ANESTHESIOLOGY

## 2024-10-04 RX ORDER — LIDOCAINE HYDROCHLORIDE 20 MG/ML
INJECTION, SOLUTION INFILTRATION; PERINEURAL
Status: DISCONTINUED | OUTPATIENT
Start: 2024-10-04 | End: 2024-10-04 | Stop reason: HOSPADM

## 2024-10-04 RX ORDER — LIDOCAINE HYDROCHLORIDE 10 MG/ML
INJECTION, SOLUTION EPIDURAL; INFILTRATION; INTRACAUDAL; PERINEURAL
Status: DISCONTINUED | OUTPATIENT
Start: 2024-10-04 | End: 2024-10-04 | Stop reason: HOSPADM

## 2024-10-04 RX ORDER — DEXAMETHASONE SODIUM PHOSPHATE 10 MG/ML
INJECTION INTRAMUSCULAR; INTRAVENOUS
Status: DISCONTINUED | OUTPATIENT
Start: 2024-10-04 | End: 2024-10-04 | Stop reason: HOSPADM

## 2024-10-04 RX ORDER — ALPRAZOLAM 0.25 MG/1
0.5 TABLET, ORALLY DISINTEGRATING ORAL
Status: DISCONTINUED | OUTPATIENT
Start: 2024-10-04 | End: 2024-10-04 | Stop reason: HOSPADM

## 2024-10-04 RX ADMIN — ALPRAZOLAM 0.5 MG: 0.25 TABLET, ORALLY DISINTEGRATING ORAL at 11:10

## 2024-10-04 NOTE — OP NOTE
Cervical Interlaminar Epidural Steroid Injection under Fluoroscopic Guidance    The procedure, risks, benefits, and options were discussed with the patient. There are no contraindications to the procedure. The patent expressed understanding and agreed to the procedure. Informed written consent was obtained prior to the start of the procedure and can be found in the patient's chart.     PATIENT NAME: Dimitrios Cazares   MRN: [id number]     DATE OF PROCEDURE: 10/04/2024    PROCEDURE: Cervical Interlaminar Epidural Steroid Injection C7/T1 under Fluoroscopic Guidance    PRE-OP DIAGNOSIS: Cervical radiculitis [M54.12] Cervical radiculopathy [M54.12]    POST-OP DIAGNOSIS: Same    PHYSICIAN: Corbin Pickering MD   MEDICATIONS INJECTED: Preservative-free Decadron 10mg with 1cc of Lidocaine 1% MPF and preservative free normal saline    LOCAL ANESTHETIC INJECTED: Xylocaine 2%     SEDATION: oral    ESTIMATED BLOOD LOSS: None    COMPLICATIONS: None    TECHNIQUE: Time-out was performed to identify the patient and procedure to be performed. With the patient laying in a prone position, the surgical area was prepped and draped in the usual sterile fashion using ChloraPrep and a fenestrated drape. The level was determined under fluoroscopy guidance. Skin anesthesia was achieved by injecting Lidocaine 2% over the injection site.  The interlaminar space was then approached with a 20 gauge, 3.5 inch Tuohy needle that was introduced under fluoroscopic guidance with AP, lateral and/or contralateral oblique imaging. Once the Ligamentum flavum was encountered loss of resistance to saline was used to enter the epidural space. With positive loss of resistance and negative aspiration for CSF or Blood, contrast dye  Omnipaque (300mg/mL) was injected to confirm placement and there was no vascular runoff. Then 5 mL of the medication mixture listed above was then injected slowly. Displacement of the radio opaque contrast after injection of the medication  confirmed that the medication went into the area of the epidural space. The needles were removed, and bleeding was nil. A sterile dressing was applied. No specimens collected. The patient tolerated the procedure well.       The patient was monitored after the procedure in the recovery area. They were given post-procedure and discharge instructions to follow at home. The patient was discharged in a stable condition.    Corbin Pickering MD

## 2024-10-04 NOTE — DISCHARGE SUMMARY
Discharge Note  Short Stay    Admit Date: 10/4/2024    Attending Physician: Corbin Pickering    Discharge Physician: Corbin Pickering    Discharge Date: 10/4/2024 12:09 PM    Procedure(s) (LRB):  CERVICAL EPIDURAL STEROID INJECTION (C7-T1) (ORAL SEDATION) (N/A)    Final Diagnosis: Cervical radiculitis [M54.12]    Disposition: Home or self care    Patient Instructions:   Current Discharge Medication List        CONTINUE these medications which have NOT CHANGED    Details   albuterol (PROVENTIL) 2.5 mg /3 mL (0.083 %) nebulizer solution daily as needed. Pt taking prn  Refills: 0      amLODIPine (NORVASC) 2.5 MG tablet Take 1 tablet (2.5 mg total) by mouth once daily.  Qty: 90 tablet, Refills: 2    Comments: .      FLUoxetine 20 MG capsule Take 1 capsule (20 mg total) by mouth once daily.  Qty: 90 capsule, Refills: 2    Associated Diagnoses: Depression, unspecified depression type      gabapentin (NEURONTIN) 300 MG capsule Take 1 capsule (300 mg total) by mouth 3 (three) times daily.  Qty: 90 capsule, Refills: 0      lisinopriL (PRINIVIL,ZESTRIL) 20 MG tablet Take 1 tablet (20 mg total) by mouth once daily.  Qty: 90 tablet, Refills: 2    Comments: .  Associated Diagnoses: Essential hypertension      metFORMIN (GLUCOPHAGE) 500 MG tablet Take 1 tablet (500 mg total) by mouth daily with breakfast.  Qty: 90 tablet, Refills: 1    Associated Diagnoses: Type 2 diabetes mellitus without complication, without long-term current use of insulin      pantoprazole (PROTONIX) 40 MG tablet Take 1 tablet (40 mg total) by mouth once daily.  Qty: 90 tablet, Refills: 1    Associated Diagnoses: Nausea      rosuvastatin (CRESTOR) 20 MG tablet Take 1 tablet (20 mg total) by mouth every evening.  Qty: 90 tablet, Refills: 1    Associated Diagnoses: Type 2 diabetes mellitus without complication, without long-term current use of insulin; Mixed hyperlipidemia      blood sugar diagnostic (BLOOD GLUCOSE TEST) Strp Check blood sugar daily prn  Qty: 60  strip, Refills: 11    Associated Diagnoses: Type 2 diabetes mellitus without complication, without long-term current use of insulin      blood-glucose meter kit Check blood sugar daily prn  Qty: 1 each, Refills: 0    Associated Diagnoses: Type 2 diabetes mellitus without complication, without long-term current use of insulin      diclofenac (VOLTAREN) 75 MG EC tablet Take 1 tablet (75 mg total) by mouth 2 (two) times daily.  Qty: 60 tablet, Refills: 1    Associated Diagnoses: Arthritis      lancets Misc Check blood sugar daily prn  Qty: 100 each, Refills: 11    Associated Diagnoses: Type 2 diabetes mellitus without complication, without long-term current use of insulin      ondansetron (ZOFRAN-ODT) 4 MG TbDL              Discharge Diagnosis: Cervical radiculitis [M54.12]  Condition on Discharge: Stable with no complications to procedure   Diet on Discharge: Same as before.  Activity: as per instruction sheet.  Discharge to: Home with a responsible adult.  Follow up: 2-4 weeks       Please call my office or pager at [phone] if experienced any weakness or loss of sensation, fever > 101.5, pain uncontrolled with oral medications, persistent nausea/vomiting/or diarrhea, redness or drainage from the incisions, or any other worrisome concerns. If physician on call was not reached or could not communicate with our office for any reason please go to the nearest emergency department.     Corbin Pickering  10/04/2024

## 2024-10-04 NOTE — PLAN OF CARE
Dc instructions given to pt. Voiced compliance and understanding. Dc'd home in stable condition.

## 2024-10-04 NOTE — DISCHARGE INSTRUCTIONS
DIET: You may resume your normal diet today.    BATHING: You may resume your normal bathing.          You may shower, no hot water directly on site for 24 hours.    DRESSING: You may remove your bandage today.    ACTIVITY LEVEL: You may resume your normal activities 24 hours after your  procedure.    If you have received sedation or an anesthetic, you may feel sleepy  for several hours. Rest until you are more awake. Gradually resume your normal activities tomorrow.    If you have received sedation or an anesthetic, do not drive or operate heavy machinery for at least 24 hours.    MEDICATION: You may resume your normal medications today.    You will receive instructions for any pain prescriptions. Pain medications should be taken only as directed.    SPECIAL INSTRUCTIONS: No heat to the injection site for 24 hours including: bath or shower, heating pad, moist heat, hot tubs.    Use ice pack to injection site for any pain or discomfort. Apply ice pack to 20 minutes then remove for 20 minutes before re-applying to site.    WHEN TO CALL DOCTOR: Redness or swelling around injection site    Fever of 101F    Drainage (pus) from the injection site    For any continuous bleeding (some dried blood over the incision is normal).    FOLLOW UP: Follow up phone call will be made by office.        Diet on Discharge: Same as before.  Activity: as per instruction sheet.  Discharge to: Home with a responsible adult.  Follow up: 2-4 weeks        Please call my office or pager at [phone] if experienced any weakness or loss of sensation, fever > 101.5, pain uncontrolled with oral medications, persistent nausea/vomiting/or diarrhea, redness or drainage from the incisions, or any other worrisome concerns. If physician on call was not reached or could not communicate with our office for any reason please go to the nearest emergency department.      Corbin Pickering

## 2024-10-04 NOTE — H&P
HPI  Patient presenting for Procedure(s) (LRB):  CERVICAL EPIDURAL STEROID INJECTION (C7-T1) (ORAL SEDATION) (N/A)     Patient on Anti-coagulation No    Patient is not on antibiotic for the last 2 weeks    Patient has a Ride Home assistance.    No health changes since previous encounter    Past Medical History:   Diagnosis Date    Carpal tunnel syndrome on both sides     Hypertension      Past Surgical History:   Procedure Laterality Date    ABDOMINAL HERNIA REPAIR  08/20/2019    GALLBLADDER SURGERY  08/20/2019    HERNIA REPAIR  2003    WISDOM TOOTH EXTRACTION  1997     Review of patient's allergies indicates:   Allergen Reactions    Aspirin Other (See Comments)     Mitral valve prolapse. Heart doctor does not allow patient to take      No current facility-administered medications for this encounter.       PMHx, PSHx, Allergies, Medications reviewed in epic    ROS negative except pain complaints in HPI    OBJECTIVE:    There were no vitals taken for this visit.    PHYSICAL EXAMINATION:    GENERAL: Well appearing, in no acute distress, alert and oriented x3.  PSYCH:  Mood and affect appropriate.  SKIN: Skin color, texture, turgor normal, no rashes or lesions which will impact the procedure.  CV: RRR with palpation of the radial artery.  PULM: No evidence of respiratory difficulty, symmetric chest rise. Clear to auscultation.  NEURO: Cranial nerves grossly intact.    Plan:    Proceed with procedure as planned Procedure(s) (LRB):  CERVICAL EPIDURAL STEROID INJECTION (C7-T1) (ORAL SEDATION) (N/A)    Corbin Pickering  Interventional Pain  10/04/2024

## 2024-10-14 ENCOUNTER — HOSPITAL ENCOUNTER (OUTPATIENT)
Dept: PULMONOLOGY | Facility: HOSPITAL | Age: 44
Discharge: HOME | End: 2024-10-14
Attending: FAMILY MEDICINE
Payer: COMMERCIAL

## 2024-10-14 ENCOUNTER — HOSPITAL ENCOUNTER (OUTPATIENT)
Dept: PREADMISSION TESTING | Facility: HOSPITAL | Age: 44
Discharge: HOME | End: 2024-10-14
Attending: FAMILY MEDICINE
Payer: COMMERCIAL

## 2024-10-14 DIAGNOSIS — Z01.818 PREOP TESTING: ICD-10-CM

## 2024-10-14 PROCEDURE — 93005 ELECTROCARDIOGRAM TRACING: CPT

## 2024-10-14 PROCEDURE — 93010 ELECTROCARDIOGRAM REPORT: CPT | Mod: ,,, | Performed by: INTERNAL MEDICINE

## 2024-10-14 NOTE — DISCHARGE INSTRUCTIONS
Colonoscopy Prep Instructions      Date: Tuesday 10/22/24   Arrival time:       IMPORTANT: PLEASE READ YOUR INSTRUCTIONS CAREFULLY. FAILURE TO FOLLOW THESE INSTRUCTIONS MAY RESULT IN YOUR PROCEURE BEING CANCELED,RESCHEDULED, OR REPEATED.            Clear Liquid Diet     MONDAY: The day before your procedure you will follow a clear liquid diet ALL day.     You may have any of the following:     Water, tea, coffee (decaffeinated or regular)     Soft drinks (regular or sugar free)     Gelatin dessert (plain or flavored)     Juice, Gatorade, Powerade, Crystal Lite, lemonade, limeade, Adi-Aid     Bouillon, clear consommé, 100% fat free beef, chicken, or vegetable broths     Snowballs, popsicles     100% cranberry juice is the only red liquid allowed     Please Avoid the following:     Anything with RED dye     Liquids not specifically listed     Dairy (liquid and powder)     Creamers (liquid and powder)     Alcohol            Suprep Instructions:   Please disregard the Suprep insert/box instructions from pharmacy.         The day before your procedure: MONDAY    Upon awakening begin the clear liquid diet. DO NOT EAT SOLID FOODS     Drink at least 6-8 glasses of clear liquids until you begin your prep     You may mix your prep Monday morning     At 11:00 am: Take four (4) Dulcolax tablets (Bisacodyl) with 8 ounces of clear liquids.     At 2:00 pm: Pour one 6-ounce bottle of Suprep into the mixing container                          Add water to the 16-ounce line on the container and mix                Drink all the mixed prep plus 2 more 16-ounce containers of clear liquid within 2 hours    It is common to experience abdominal cramping, nausea, and vomiting when taking the prep. If you have nausea and/or vomiting while taking the prep, stop drinking for 20-30 minutes then resume.      You may continue with the clear liquids after this step.     At 6:00 pm          Pour one 6-ounce bottle of Suprep into the mixing  container             Add water to the 16-ounce line on the container and mix             Drink all the mixed prep plus 2 more 16-ounce containers of clear liquid within 2 hours    Once you are done with your prep, you can continue to have clear liquids through the night until 4am.    Before arrival, you will hold all morning medications except ______________ with a sip of water.    YOU MUST HAVE A  HOME

## 2024-10-15 LAB
OHS QRS DURATION: 98 MS
OHS QTC CALCULATION: 417 MS

## 2024-10-15 RX ORDER — GABAPENTIN 300 MG/1
300 CAPSULE ORAL 3 TIMES DAILY
Qty: 90 CAPSULE | Refills: 0 | Status: SHIPPED | OUTPATIENT
Start: 2024-10-15

## 2024-10-16 ENCOUNTER — PATIENT OUTREACH (OUTPATIENT)
Dept: ADMINISTRATIVE | Facility: HOSPITAL | Age: 44
End: 2024-10-16
Payer: COMMERCIAL

## 2024-10-16 NOTE — PROGRESS NOTES
[facility] Exam Report.  Chart reviewed, immunization record updated.  No new results noted on [facility] or [facility] web site.  Care Everywhere updated.   Patient care coordination note  Upcoming PCP visit updated.  Next PCP visit 3/27/2025 and lab visit on 3/19/2025.  Discussed eye exam with patient, patient states he will schedule with eye doctor, declined eyecam.

## 2024-10-21 ENCOUNTER — PATIENT MESSAGE (OUTPATIENT)
Dept: ADMINISTRATIVE | Facility: HOSPITAL | Age: 44
End: 2024-10-21
Payer: COMMERCIAL

## 2024-10-31 ENCOUNTER — OFFICE VISIT (OUTPATIENT)
Dept: PAIN MEDICINE | Facility: CLINIC | Age: 44
End: 2024-10-31
Payer: COMMERCIAL

## 2024-10-31 VITALS — BODY MASS INDEX: 34.58 KG/M2 | WEIGHT: 217.5 LBS

## 2024-10-31 DIAGNOSIS — G89.29 CHRONIC NECK PAIN: ICD-10-CM

## 2024-10-31 DIAGNOSIS — M54.2 CHRONIC NECK PAIN: ICD-10-CM

## 2024-10-31 DIAGNOSIS — M47.812 ARTHROPATHY OF CERVICAL FACET JOINT: ICD-10-CM

## 2024-10-31 DIAGNOSIS — M54.12 CERVICAL RADICULITIS: Primary | ICD-10-CM

## 2024-10-31 PROCEDURE — 99999 PR PBB SHADOW E&M-EST. PATIENT-LVL III: CPT | Mod: PBBFAC,,, | Performed by: ANESTHESIOLOGY

## 2024-10-31 RX ORDER — GABAPENTIN 300 MG/1
300 CAPSULE ORAL 2 TIMES DAILY
Qty: 60 CAPSULE | Refills: 11 | Status: SHIPPED | OUTPATIENT
Start: 2024-10-31 | End: 2025-10-31

## 2024-11-04 ENCOUNTER — TELEPHONE (OUTPATIENT)
Dept: PHARMACY | Facility: CLINIC | Age: 44
End: 2024-11-04
Payer: COMMERCIAL

## 2024-11-04 NOTE — TELEPHONE ENCOUNTER
[facility]/Population Health Chart Review & Patient Outreach Details For Medication Adherence Project    Reason for Outreach Encounter: 3rd Party payor non-compliance report ([employer], [employer], [employer], etc)  2.  Patient Outreach Method: Reviewed patient chart   3.   Medication in question: rosuvastatin     rosuvastatin  last filled  8/26/24 for 90 day supply    4.  Reviewed and or Updates Made To: Patient Chart  5. Outreach Outcomes and/or actions taken: Patient filled medication and is on track to be adherent  Additional Notes:

## 2024-11-07 DIAGNOSIS — M19.90 ARTHRITIS: ICD-10-CM

## 2024-11-07 RX ORDER — DICLOFENAC SODIUM 75 MG/1
75 TABLET, DELAYED RELEASE ORAL 2 TIMES DAILY
Qty: 60 TABLET | Refills: 1 | Status: SHIPPED | OUTPATIENT
Start: 2024-11-07

## 2024-11-07 NOTE — TELEPHONE ENCOUNTER
No care due was identified.  Health [facility] Embedded Care Due Messages. Reference number: [id number].   11/07/2024 6:58:20 AM CST

## 2024-11-07 NOTE — TELEPHONE ENCOUNTER
LOV 09/19/2024    patient requesting refill for diclofenac (VOLTAREN) 75 MG EC tablet         RX pending   pharmacy confirmed  please advise

## 2024-11-12 LAB
LEFT EYE DM RETINOPATHY: NEGATIVE
RIGHT EYE DM RETINOPATHY: NEGATIVE

## 2024-11-26 ENCOUNTER — ANESTHESIA (OUTPATIENT)
Dept: ENDOSCOPY | Facility: HOSPITAL | Age: 44
End: 2024-11-26
Payer: COMMERCIAL

## 2024-11-26 ENCOUNTER — ANESTHESIA EVENT (OUTPATIENT)
Dept: ENDOSCOPY | Facility: HOSPITAL | Age: 44
End: 2024-11-26
Payer: COMMERCIAL

## 2024-11-26 ENCOUNTER — HOSPITAL ENCOUNTER (OUTPATIENT)
Dept: ENDOSCOPY | Facility: HOSPITAL | Age: 44
Discharge: HOME | End: 2024-11-26
Attending: STUDENT IN AN ORGANIZED HEALTH CARE EDUCATION/TRAINING PROGRAM
Payer: COMMERCIAL

## 2024-11-26 DIAGNOSIS — Z12.11 SCREEN FOR COLON CANCER: ICD-10-CM

## 2024-11-26 PROCEDURE — 37000009 HC ANESTHESIA EA ADD 15 MINS

## 2024-11-26 PROCEDURE — 37000008 HC ANESTHESIA 1ST 15 MINUTES

## 2024-11-26 PROCEDURE — G0121 COLON CA SCRN NOT HI RSK IND: HCPCS | Performed by: FAMILY MEDICINE

## 2024-11-26 PROCEDURE — 45378 DIAGNOSTIC COLONOSCOPY: CPT | Mod: ,,, | Performed by: FAMILY MEDICINE

## 2024-11-26 PROCEDURE — 63600175 PHARM REV CODE 636 W HCPCS: Performed by: NURSE ANESTHETIST, CERTIFIED REGISTERED

## 2024-11-26 RX ORDER — LIDOCAINE HYDROCHLORIDE 20 MG/ML
INJECTION, SOLUTION EPIDURAL; INFILTRATION; INTRACAUDAL; PERINEURAL
Status: DISCONTINUED | OUTPATIENT
Start: 2024-11-26 | End: 2024-11-26

## 2024-11-26 RX ORDER — PROPOFOL 10 MG/ML
VIAL (ML) INTRAVENOUS
Status: DISCONTINUED | OUTPATIENT
Start: 2024-11-26 | End: 2024-11-26

## 2024-11-26 RX ADMIN — PROPOFOL 100 MG: 10 INJECTION, EMULSION INTRAVENOUS at 08:11

## 2024-11-26 RX ADMIN — LIDOCAINE HYDROCHLORIDE 50 MG: 20 INJECTION, SOLUTION EPIDURAL; INFILTRATION; INTRACAUDAL; PERINEURAL at 08:11

## 2024-11-26 NOTE — ANESTHESIA POSTPROCEDURE EVALUATION
Anesthesia Post Evaluation    Patient: Dimitrios Cazares    Procedure(s) Performed: * No procedures listed *    Final Anesthesia Type: general      Patient location during evaluation: PACU  Patient participation: Yes- Able to Participate  Level of consciousness: awake and alert, oriented and awake  Post-procedure vital signs: reviewed and stable  Pain management: adequate  Airway patency: patent    PONV status at discharge: No PONV  Anesthetic complications: no      Cardiovascular status: blood pressure returned to baseline, hemodynamically stable and stable  Respiratory status: unassisted, spontaneous ventilation and room air  Hydration status: euvolemic  Follow-up not needed.              Vitals Value Taken Time   /66 11/26/24 0920   Temp 37 11/26/24 0930   Pulse 82 11/26/24 0920   Resp 16 11/26/24 0920   SpO2 94 % 11/26/24 0920         Event Time   Out of Recovery 09:30:05         Pain/Carlos Manuel Score: Carlos Manuel Score: 10 (11/26/2024  9:14 AM)

## 2024-11-26 NOTE — H&P
Subjective:    Dimitrios Cazares is a 44 y.o. male here for colonoscopy    Past Medical History:   Diagnosis Date    Carpal tunnel syndrome on both sides     Hypertension      Past Surgical History:   Procedure Laterality Date    ABDOMINAL HERNIA REPAIR  08/20/2019    EPIDURAL STEROID INJECTION INTO CERVICAL SPINE N/A 10/4/2024    Procedure: CERVICAL EPIDURAL STEROID INJECTION (C7-T1 INTERLAMINAR) (ORAL SEDATION);  Surgeon: Corbin Pickering MD;  Location: [facility] OR;  Service: Pain Management;  Laterality: N/A;    GALLBLADDER SURGERY  08/20/2019    HERNIA REPAIR  2003    WISDOM TOOTH EXTRACTION  1997     No family history on file.  Social History     Tobacco Use    Smoking status: Every Day     Current packs/day: 0.25     Types: Cigarettes     Passive exposure: Current    Smokeless tobacco: Never   Substance Use Topics    Alcohol use: Yes     Comment: 3-4 beers per week    Drug use: Never       (Not in a hospital admission)      Review of patient's allergies indicates:   Allergen Reactions    Aspirin Other (See Comments)     Mitral valve prolapse. Heart doctor does not allow patient to take       Review of Systems   Constitutional: Negative for fever and chills.   HENT: Negative for hearing loss.    Eyes: Negative for blurred vision and double vision.   Respiratory: Negative for cough and shortness of breath.    Cardiovascular: Negative for chest pain and palpitations.   Gastrointestinal: Negative for heartburn, nausea, vomiting and abdominal pain.   Genitourinary: Negative for dysuria and frequency.   Musculoskeletal: Negative for myalgias, joint pain and falls.   Skin: Negative for itching and rash.   Neurological: Negative for dizziness, tingling and headaches.   Endo/Heme/Allergies: Does not bruise/bleed easily.   Psychiatric/Behavioral: Negative for depression and suicidal ideas.       Objective:        Temp:  [97.4 °F (36.3 °C)]   Pulse:  [76]   Resp:  [18]   BP: (134)/(75)   SpO2:  [99 %]       Physical Exam    Constitutional: He is oriented to person, place, and time. He appears well-developed and well-nourished.   HENT:   Head: Normocephalic and atraumatic.   Right Ear: External ear normal.   Left Ear: External ear normal.   Nose: Nose normal.   Mouth/Throat: Oropharynx is clear and moist.   Eyes: Conjunctivae normal and EOM are normal. Pupils are equal, round, and reactive to light. Right eye exhibits no discharge. Left eye exhibits no discharge. No scleral icterus.   Neck: Normal range of motion. Neck supple. No JVD present. No tracheal deviation present. No thyromegaly present.   Cardiovascular: Normal rate, regular rhythm, normal heart sounds and intact distal pulses.    No murmur heard.  Pulmonary/Chest: Effort normal and breath sounds normal. No respiratory distress. He has no wheezes. He has no rales. He exhibits no tenderness.   Abdominal: Soft. Bowel sounds are normal. He exhibits no distension and no mass. There is no tenderness. There is no rebound and no guarding.   Musculoskeletal: Normal range of motion.   Lymphadenopathy:     He has no cervical adenopathy.   Neurological: He is alert and oriented to person, place, and time. He has normal reflexes. No cranial nerve deficit. He exhibits normal muscle tone. Coordination normal.   Skin: Skin is warm and dry.   Psychiatric: He has a normal mood and affect. His behavior is normal. Judgment and thought content normal.           Assessment:      There are no hospital problems to display for this patient.        Plan:    ASA grade 2. Proceed with MAC for colonoscopy    Patient cleared for Anesthesia:  MAC and general    Anesthesia/Surgery risks, benefits, and alternative options discussed and understood by patient/family.

## 2024-11-26 NOTE — ANESTHESIA PREPROCEDURE EVALUATION
11/26/2024  Dimitrios Cazares is a 44 y.o., male.      Pre-op Assessment    I have reviewed the Patient Summary Reports.     I have reviewed the Nursing Notes. I have reviewed the NPO Status.   I have reviewed the Medications.     Review of Systems  Anesthesia Hx:  No problems with previous Anesthesia                Social:  No Alcohol Use, Non-Smoker       Hematology/Oncology:  Hematology Normal   Oncology Normal                                   EENT/Dental:  EENT/Dental Normal           Cardiovascular:     Hypertension                                          Pulmonary:        Sleep Apnea                Renal/:  Renal/ Normal                 Hepatic/GI:  Hepatic/GI Normal                    Musculoskeletal:  Musculoskeletal Normal                Neurological:    Neuromuscular Disease,                                   Endocrine:  Diabetes, well controlled, type 2           Dermatological:  Skin Normal    Psych:  Psychiatric History                Physical Exam  General: Well nourished    Airway:  Mallampati: II   Mouth Opening: Normal  TM Distance: Normal  Tongue: Normal  Neck ROM: Normal ROM    Chest/Lungs:  Clear to auscultation    Heart:  Rate: Normal  Rhythm: Regular Rhythm  Sounds: Normal      Anesthesia Plan  Type of Anesthesia, risks & benefits discussed:    Anesthesia Type: MAC  Intra-op Monitoring Plan: Standard ASA Monitors  Post Op Pain Control Plan: multimodal analgesia  Induction:  IV  Airway Plan: Direct  Informed Consent: Informed consent signed with the Patient and all parties understand the risks and agree with anesthesia plan.  All questions answered.   ASA Score: 2  Day of Surgery Review of History & Physical: H&P Update referred to the surgeon/provider.I have interviewed and examined the patient. I have reviewed the patient's H&P dated: 11/26/24. There are no significant changes.      Ready For Surgery From Anesthesia Perspective.     .       CARDIOLOGY FOLLOW UP - Dr. Clements  Date of Service: 6/26/2023  CC: no events    Review of Systems:  Constitutional: No fever, weight loss, or fatigue  Respiratory: No cough, wheezing, or hemoptysis, no shortness of breath  Cardiovascular: No chest pain, palpitations, passing out, dizziness, or leg swelling  Gastrointestinal: No abd or epigastric pain. No nausea, vomiting, or hematemesis; no diarrhea or consiptaiton, no melena or hematochezia  Vascular: No edema     TELEMETRY:    PHYSICAL EXAM:  T(C): 37.1 (06-26-23 @ 10:36), Max: 37.1 (06-26-23 @ 10:36)  HR: 89 (06-26-23 @ 10:36) (89 - 91)  BP: 111/54 (06-26-23 @ 10:36) (108/53 - 137/50)  RR: 17 (06-26-23 @ 10:36) (17 - 17)  SpO2: 99% (06-26-23 @ 10:36) (98% - 99%)  Wt(kg): --  I&O's Summary    25 Jun 2023 07:01  -  26 Jun 2023 07:00  --------------------------------------------------------  IN: 0 mL / OUT: 55 mL / NET: -55 mL        Appearance: Normal	  Cardiovascular: Normal S1 S2,RRR, No JVD, No murmurs  Respiratory: Lungs clear to auscultation	  Gastrointestinal:  Soft, Non-tender, + BS	  Extremities: Normal range of motion, No clubbing, cyanosis or edema  Vascular: Peripheral pulses palpable 2+ bilaterally       Home Medications:  bisacodyl 5 mg oral tablet: 2 tab(s) orally once a day as needed for  constipation (04 Jun 2023 00:48)  cinacalcet 60 mg oral tablet: 1 tab(s) orally once a day (04 Jun 2023 00:48)  donepezil 5 mg oral tablet: 1 tab(s) orally once a day (at bedtime) (04 Jun 2023 00:48)  ergocalciferol 1.25 mg (50,000 intl units) oral capsule: 1 cap(s) orally once a month (04 Jun 2023 00:48)  meloxicam 15 mg oral tablet: 1 tab(s) orally once a day as needed for  pain (04 Jun 2023 00:48)  NIFEdipine (Eqv-Adalat CC) 30 mg oral tablet, extended release: 2 tab(s) orally once a day (04 Jun 2023 00:48)  sevelamer hydrochloride 800 mg oral tablet: 1 tab(s) orally every 6 hours (04 Jun 2023 00:49)        MEDICATIONS  (STANDING):  aspirin  chewable 81 milliGRAM(s) Oral daily  ceFAZolin   IVPB 2000 milliGRAM(s) IV Intermittent <User Schedule>  ceFAZolin   IVPB 3000 milliGRAM(s) IV Intermittent <User Schedule>  chlorhexidine 2% Cloths 1 Application(s) Topical daily  dextrose 5%. 1000 milliLiter(s) (50 mL/Hr) IV Continuous <Continuous>  dextrose 5%. 1000 milliLiter(s) (100 mL/Hr) IV Continuous <Continuous>  dextrose 50% Injectable 25 Gram(s) IV Push once  dextrose 50% Injectable 25 Gram(s) IV Push once  dextrose 50% Injectable 12.5 Gram(s) IV Push once  donepezil 5 milliGRAM(s) Oral at bedtime  epoetin shell-epbx (RETACRIT) Injectable 78833 Unit(s) IV Push <User Schedule>  heparin   Injectable 5000 Unit(s) SubCutaneous every 8 hours  metoprolol tartrate 25 milliGRAM(s) Oral two times a day  midodrine. 10 milliGRAM(s) Oral <User Schedule>  pantoprazole   Suspension 40 milliGRAM(s) Oral daily        EKG:  RADIOLOGY:  DIAGNOSTIC TESTING:  [ ] Echocardiogram:  [ ] Catherterization:  [ ] Stress Test:  OTHER:     LABS:	 	                          7.2    11.05 )-----------( 297      ( 26 Jun 2023 06:10 )             24.6     06-26    139  |  103  |  47<H>  ----------------------------<  82  5.0   |  27  |  6.28<H>    Ca    10.4      26 Jun 2023 06:10  Phos  4.8     06-26  Mg     2.50     06-26            CARDIAC MARKERS:

## 2024-11-26 NOTE — TRANSFER OF CARE
Anesthesia Transfer of Care Note    Patient: Dimitrios Cazares    Procedure(s) Performed: * No procedures listed *    Patient location: PACU    Anesthesia Type: general    Transport from OR: Transported from OR on 6-10 L/min O2 by face mask with adequate spontaneous ventilation    Post pain: adequate analgesia    Post assessment: no apparent anesthetic complications and tolerated procedure well    Post vital signs: stable    Level of consciousness: sedated    Nausea/Vomiting: no nausea/vomiting    Complications: none    Transfer of care protocol was followed      Last vitals: Visit Vitals  /75 (BP Location: Left arm, Patient Position: Lying)   Pulse 76   Temp 36.3 °C (97.4 °F) (Skin)   Resp 18   SpO2 99%

## 2024-11-27 VITALS
DIASTOLIC BLOOD PRESSURE: 66 MMHG | HEART RATE: 82 BPM | TEMPERATURE: 97 F | OXYGEN SATURATION: 94 % | SYSTOLIC BLOOD PRESSURE: 114 MMHG | RESPIRATION RATE: 16 BRPM

## 2024-12-15 DIAGNOSIS — E11.9 TYPE 2 DIABETES MELLITUS WITHOUT COMPLICATION, WITHOUT LONG-TERM CURRENT USE OF INSULIN: ICD-10-CM

## 2024-12-15 DIAGNOSIS — E78.2 MIXED HYPERLIPIDEMIA: ICD-10-CM

## 2024-12-15 DIAGNOSIS — M54.12 CERVICAL RADICULITIS: ICD-10-CM

## 2024-12-16 RX ORDER — GABAPENTIN 300 MG/1
300 CAPSULE ORAL 2 TIMES DAILY
Qty: 60 CAPSULE | Refills: 11 | Status: SHIPPED | OUTPATIENT
Start: 2024-12-16 | End: 2025-12-16

## 2024-12-16 NOTE — TELEPHONE ENCOUNTER
Care Due:                  Date            Visit Type   Department     Provider  --------------------------------------------------------------------------------                                EP -                              [facility]  Last Visit: 09-      CARE ([facility])   [facility]                               -                              [facility]  Next Visit: 03-      CARE ([facility])   [facility] Vera                                                            Last  Test          Frequency    Reason                     Performed    Due Date  --------------------------------------------------------------------------------    HBA1C.......  6 months...  metFORMIN................  08- 02-    Health [facility] Embedded Care Due Messages. Reference number: [id number].   12/15/2024 9:02:36 PM CST

## 2024-12-16 NOTE — TELEPHONE ENCOUNTER
No care due was identified.  Health [facility] Embedded Care Due Messages. Reference number: [id number].   12/15/2024 9:07:16 PM CST

## 2024-12-17 ENCOUNTER — PATIENT OUTREACH (OUTPATIENT)
Dept: ADMINISTRATIVE | Facility: HOSPITAL | Age: 44
End: 2024-12-17
Payer: COMMERCIAL

## 2024-12-17 RX ORDER — METFORMIN HYDROCHLORIDE 500 MG/1
500 TABLET ORAL
Qty: 90 TABLET | Refills: 1 | Status: SHIPPED | OUTPATIENT
Start: 2024-12-17 | End: 2025-12-17

## 2024-12-17 RX ORDER — ROSUVASTATIN CALCIUM 20 MG/1
20 TABLET, COATED ORAL NIGHTLY
Qty: 90 TABLET | Refills: 1 | Status: SHIPPED | OUTPATIENT
Start: 2024-12-17

## 2024-12-17 NOTE — PROGRESS NOTES
Uploaded and linked outside DM Eye Exam into [facility]

## 2025-01-10 ENCOUNTER — TELEPHONE (OUTPATIENT)
Dept: PHARMACY | Facility: CLINIC | Age: 45
End: 2025-01-10
Payer: COMMERCIAL

## 2025-01-10 NOTE — TELEPHONE ENCOUNTER
[facility]/Population Health Chart Review & Patient Outreach Details For Medication Adherence Project    Reason for Outreach Encounter: 3rd Party payor non-compliance report ([employer], [employer], C, etc)  2.  Patient Outreach Method: Reviewed Patient Chart  3.   Medication in question: rosuvastatin    LAST FILLED: 12/17/24 for 90 day supply  Hyperlipidemia Medications               rosuvastatin (CRESTOR) 20 MG tablet Take 1 tablet (20 mg total) by mouth every evening.               4.  Reviewed and or Updates Made To: Patient Chart  5. Outreach Outcomes and/or actions taken: Patient filled medication and is on track to be adherent

## 2025-02-03 ENCOUNTER — PATIENT MESSAGE (OUTPATIENT)
Dept: FAMILY MEDICINE | Facility: CLINIC | Age: 45
End: 2025-02-03
Payer: COMMERCIAL

## 2025-02-03 NOTE — LETTER
[address] [address]  Phone: [phone]  Fax: [phone] February 3, 2025     Patient: Dimitrios Cazares   YOB: 1980   Date of Visit: 2/3/2025       To Whom It May Concern:    Fredy's cpap machine is in good working order. He is compliant with cpap and is benefiting from it.     If you have any questions or concerns, please don't hesitate to contact my office.    Sincerely,        Geronimo Vera MD

## 2025-03-02 ENCOUNTER — TELEPHONE (OUTPATIENT)
Dept: PHARMACY | Facility: CLINIC | Age: 45
End: 2025-03-02
Payer: COMMERCIAL

## 2025-03-02 NOTE — TELEPHONE ENCOUNTER
[facility]/Population Health Chart Review & Patient Outreach Details For Medication Adherence Project    Reason for Outreach Encounter: 3rd Party payor non-compliance report ([employer], [employer], C, etc)  2.  Patient Outreach Method: Reviewed Patient Chart  3.   Medication in question: lisinopril    LAST FILLED: 1/31/25 for 90 day supply  Hypertension Medications              amLODIPine (NORVASC) 2.5 MG tablet Take 1 tablet (2.5 mg total) by mouth once daily.    lisinopriL (PRINIVIL,ZESTRIL) 20 MG tablet Take 1 tablet (20 mg total) by mouth once daily.              4.  Reviewed and or Updates Made To: Patient Chart  5. Outreach Outcomes and/or actions taken: Patient filled medication and is on track to be adherent

## 2025-03-19 ENCOUNTER — CLINICAL SUPPORT (OUTPATIENT)
Dept: FAMILY MEDICINE | Facility: CLINIC | Age: 45
End: 2025-03-19
Payer: COMMERCIAL

## 2025-03-19 DIAGNOSIS — I10 ESSENTIAL HYPERTENSION: ICD-10-CM

## 2025-03-19 DIAGNOSIS — E11.9 TYPE 2 DIABETES MELLITUS WITHOUT COMPLICATION, WITHOUT LONG-TERM CURRENT USE OF INSULIN: ICD-10-CM

## 2025-03-19 DIAGNOSIS — E11.9 TYPE 2 DIABETES MELLITUS WITHOUT COMPLICATION: ICD-10-CM

## 2025-03-19 LAB
ALBUMIN SERPL BCP-MCNC: 4.1 G/DL (ref 3.5–5.2)
ALBUMIN/CREAT UR: 12.7 UG/MG (ref 0–30)
ALP SERPL-CCNC: 90 U/L (ref 40–150)
ALT SERPL W/O P-5'-P-CCNC: 46 U/L (ref 10–44)
ANION GAP SERPL CALC-SCNC: 8 MMOL/L (ref 8–16)
AST SERPL-CCNC: 23 U/L (ref 10–40)
BILIRUB SERPL-MCNC: 0.4 MG/DL (ref 0.1–1)
BUN SERPL-MCNC: 17 MG/DL (ref 6–20)
CALCIUM SERPL-MCNC: 8.8 MG/DL (ref 8.7–10.5)
CHLORIDE SERPL-SCNC: 105 MMOL/L (ref 95–110)
CO2 SERPL-SCNC: 26 MMOL/L (ref 23–29)
CREAT SERPL-MCNC: 0.9 MG/DL (ref 0.5–1.4)
CREAT UR-MCNC: 110 MG/DL (ref 23–375)
EST. GFR  (NO RACE VARIABLE): >60 ML/MIN/1.73 M^2
ESTIMATED AVG GLUCOSE: 177 MG/DL (ref 68–131)
GLUCOSE SERPL-MCNC: 185 MG/DL (ref 70–110)
HBA1C MFR BLD: 7.8 % (ref 4–5.6)
MICROALBUMIN UR DL<=1MG/L-MCNC: 14 UG/ML
POTASSIUM SERPL-SCNC: 4.4 MMOL/L (ref 3.5–5.1)
PROT SERPL-MCNC: 7.1 G/DL (ref 6–8.4)
SODIUM SERPL-SCNC: 139 MMOL/L (ref 136–145)

## 2025-03-19 PROCEDURE — 36415 COLL VENOUS BLD VENIPUNCTURE: CPT | Mod: S$GLB,,, | Performed by: FAMILY MEDICINE

## 2025-03-19 PROCEDURE — 80053 COMPREHEN METABOLIC PANEL: CPT | Performed by: FAMILY MEDICINE

## 2025-03-19 PROCEDURE — 83036 HEMOGLOBIN GLYCOSYLATED A1C: CPT | Performed by: FAMILY MEDICINE

## 2025-03-19 PROCEDURE — 82043 UR ALBUMIN QUANTITATIVE: CPT | Performed by: FAMILY MEDICINE

## 2025-03-20 ENCOUNTER — RESULTS FOLLOW-UP (OUTPATIENT)
Dept: FAMILY MEDICINE | Facility: CLINIC | Age: 45
End: 2025-03-20

## 2025-03-27 ENCOUNTER — PATIENT MESSAGE (OUTPATIENT)
Dept: FAMILY MEDICINE | Facility: CLINIC | Age: 45
End: 2025-03-27

## 2025-03-27 ENCOUNTER — OFFICE VISIT (OUTPATIENT)
Dept: FAMILY MEDICINE | Facility: CLINIC | Age: 45
End: 2025-03-27
Payer: COMMERCIAL

## 2025-03-27 VITALS
OXYGEN SATURATION: 97 % | WEIGHT: 225.63 LBS | SYSTOLIC BLOOD PRESSURE: 120 MMHG | HEIGHT: 67 IN | DIASTOLIC BLOOD PRESSURE: 64 MMHG | BODY MASS INDEX: 35.41 KG/M2 | HEART RATE: 96 BPM | RESPIRATION RATE: 17 BRPM

## 2025-03-27 DIAGNOSIS — G56.03 BILATERAL CARPAL TUNNEL SYNDROME: ICD-10-CM

## 2025-03-27 DIAGNOSIS — G47.33 OBSTRUCTIVE SLEEP APNEA (ADULT) (PEDIATRIC): ICD-10-CM

## 2025-03-27 DIAGNOSIS — G56.02 CARPAL TUNNEL SYNDROME OF LEFT WRIST: ICD-10-CM

## 2025-03-27 DIAGNOSIS — I10 ESSENTIAL HYPERTENSION: ICD-10-CM

## 2025-03-27 DIAGNOSIS — B35.3 TINEA PEDIS OF BOTH FEET: ICD-10-CM

## 2025-03-27 DIAGNOSIS — E55.9 VITAMIN D DEFICIENCY DISEASE: ICD-10-CM

## 2025-03-27 DIAGNOSIS — E78.2 MIXED HYPERLIPIDEMIA: ICD-10-CM

## 2025-03-27 DIAGNOSIS — F32.A DEPRESSION, UNSPECIFIED DEPRESSION TYPE: ICD-10-CM

## 2025-03-27 DIAGNOSIS — E11.9 TYPE 2 DIABETES MELLITUS WITHOUT COMPLICATION, WITHOUT LONG-TERM CURRENT USE OF INSULIN: Primary | ICD-10-CM

## 2025-03-27 PROCEDURE — 99999 PR PBB SHADOW E&M-EST. PATIENT-LVL IV: CPT | Mod: PBBFAC,,, | Performed by: FAMILY MEDICINE

## 2025-03-27 RX ORDER — TRIAMCINOLONE ACETONIDE 40 MG/ML
20 INJECTION, SUSPENSION INTRA-ARTICULAR; INTRAMUSCULAR
Status: COMPLETED | OUTPATIENT
Start: 2025-03-27 | End: 2025-03-27

## 2025-03-27 RX ORDER — LIDOCAINE HYDROCHLORIDE 20 MG/ML
5 SOLUTION ORAL; TOPICAL 2 TIMES DAILY
COMMUNITY
Start: 2024-12-12

## 2025-03-27 RX ORDER — FLUOXETINE HYDROCHLORIDE 40 MG/1
40 CAPSULE ORAL DAILY
Qty: 30 CAPSULE | Refills: 5 | Status: SHIPPED | OUTPATIENT
Start: 2025-03-27 | End: 2025-09-23

## 2025-03-27 RX ORDER — CITALOPRAM 10 MG/1
1 TABLET ORAL DAILY
COMMUNITY

## 2025-03-27 RX ORDER — METFORMIN HYDROCHLORIDE 500 MG/1
500 TABLET ORAL 2 TIMES DAILY WITH MEALS
Qty: 180 TABLET | Refills: 1 | Status: SHIPPED | OUTPATIENT
Start: 2025-03-27 | End: 2026-03-27

## 2025-03-27 RX ORDER — TIRZEPATIDE 2.5 MG/.5ML
2.5 INJECTION, SOLUTION SUBCUTANEOUS
Qty: 2 ML | Refills: 5 | Status: SHIPPED | OUTPATIENT
Start: 2025-03-27 | End: 2025-09-23

## 2025-03-27 RX ORDER — CLOTRIMAZOLE 1 %
CREAM (GRAM) TOPICAL 2 TIMES DAILY
Qty: 60 G | Refills: 2 | Status: SHIPPED | OUTPATIENT
Start: 2025-03-27 | End: 2025-04-26

## 2025-03-27 RX ADMIN — TRIAMCINOLONE ACETONIDE 20 MG: 40 INJECTION, SUSPENSION INTRA-ARTICULAR; INTRAMUSCULAR at 03:03

## 2025-03-27 NOTE — PROGRESS NOTES
Subjective:       Patient ID: Dimitrios Cazares is a 44 y.o. male.    Chief Complaint: Follow-up (Pt here for 6 mth f/u. )    Pt is a 44 y.o. male who presents for evaluation and management of   Encounter Diagnoses   Name Primary?    Type 2 diabetes mellitus without complication, without long-term current use of insulin Yes    Obstructive sleep apnea (adult) (pediatric)     Vitamin D deficiency disease     Mixed hyperlipidemia     Essential hypertension     Depression, unspecified depression type     Tinea pedis of both feet    .  Doing well on current meds. Denies any side effects. Prevention is up to date.  `  `  Review of Systems   Constitutional:  Negative for fever.   Respiratory:  Negative for shortness of breath.    Cardiovascular:  Negative for chest pain.   Gastrointestinal:  Negative for anal bleeding and blood in stool.   Genitourinary:  Negative for dysuria.   Neurological:  Negative for dizziness and light-headedness.       Objective:      Physical Exam  Constitutional:       Appearance: Normal appearance. He is well-developed. He is obese. He is not ill-appearing.   HENT:      Head: Normocephalic and atraumatic.      Right Ear: External ear normal.      Left Ear: External ear normal.      Nose: Nose normal.      Mouth/Throat:      Mouth: Mucous membranes are moist.      Pharynx: No oropharyngeal exudate or posterior oropharyngeal erythema.   Eyes:      General: No scleral icterus.        Right eye: No discharge.         Left eye: No discharge.      Conjunctiva/sclera: Conjunctivae normal.      Pupils: Pupils are equal, round, and reactive to light.   Neck:      Thyroid: No thyromegaly.      Vascular: No JVD.      Trachea: No tracheal deviation.   Cardiovascular:      Rate and Rhythm: Normal rate and regular rhythm.      Heart sounds: Normal heart sounds. No murmur heard.  Pulmonary:      Effort: Pulmonary effort is normal. No respiratory distress.      Breath sounds: Normal breath sounds. No wheezing  or rales.   Chest:      Chest wall: No tenderness.   Abdominal:      General: Bowel sounds are normal. There is no distension.      Palpations: Abdomen is soft. There is no mass.      Tenderness: There is no abdominal tenderness. There is no guarding or rebound.   Musculoskeletal:         General: Normal range of motion.      Cervical back: Normal range of motion and neck supple.      Right lower leg: No edema.      Left lower leg: No edema.      Comments: Protective Sensation (w/ 10 gram monofilament):  Right: Decreased  Left: Decreased    Visual Inspection:  Tinea pedis      Pedal Pulses:   Right: Present  Left: Present    Posterior Tibialis Pulses:   Right:Present  Left: Present       Lymphadenopathy:      Cervical: No cervical adenopathy.   Skin:     General: Skin is warm and dry.   Neurological:      Mental Status: He is alert and oriented to person, place, and time.      Cranial Nerves: No cranial nerve deficit.      Motor: No abnormal muscle tone.      Coordination: Coordination normal.      Deep Tendon Reflexes: Reflexes are normal and symmetric. Reflexes normal.   Psychiatric:         Behavior: Behavior normal.         Thought Content: Thought content normal.         Judgment: Judgment normal.         Assessment:       1. Type 2 diabetes mellitus without complication, without long-term current use of insulin    2. Obstructive sleep apnea (adult) (pediatric)    3. Vitamin D deficiency disease    4. Mixed hyperlipidemia    5. Essential hypertension    6. Depression, unspecified depression type    7. Tinea pedis of both feet        Plan:   1. Type 2 diabetes mellitus without complication, without long-term current use of insulin  Overview:  Lab Results   Component Value Date    HGBA1C 7.8 (H) 03/19/2025         Orders:  -     metFORMIN (GLUCOPHAGE) 500 MG tablet; Take 1 tablet (500 mg total) by mouth 2 (two) times daily with meals.  Dispense: 180 tablet; Refill: 1  -     tirzepatide (MOUNJARO) 2.5 mg/0.5 mL  PnIj; Inject 2.5 mg into the skin every 7 days.  Dispense: 2 mL; Refill: 5  -     Comprehensive Metabolic Panel; Future; Expected date: 06/25/2025  -     Hemoglobin A1C; Future; Expected date: 06/25/2025    2. Obstructive sleep apnea (adult) (pediatric)  Overview:  Has cpap which he uses nightly     Compliant with it and benefiting from it.       3. Vitamin D deficiency disease  -     Vitamin D; Future; Expected date: 06/25/2025    4. Mixed hyperlipidemia  Overview:  Lab Results   Component Value Date    LDLCALC 138.2 08/26/2024     LDL with work recenlty was 72   Crestor 20         5. Essential hypertension  Overview:  Linsipril 20  Norvasc 2.5          6. Depression, unspecified depression type  Overview:  Mostly anger   Prozac increased to 40         Orders:  -     FLUoxetine 40 MG capsule; Take 1 capsule (40 mg total) by mouth once daily.  Dispense: 30 capsule; Refill: 5    7. Tinea pedis of both feet  -     clotrimazole (LOTRIMIN) 1 % cream; Apply topically 2 (two) times daily.  Dispense: 60 g; Refill: 2    Making DM med changes  RTC 3 months       No follow-ups on file.

## 2025-03-27 NOTE — LETTER
[address] [address]  Phone: [phone]  Fax: [phone] March 27, 2025     Patient: Dimitrios Cazares   YOB: 1980   Date of Visit: 3/27/2025       To Whom It May Concern:    Fredy's cpap machine is in good working order. He is compliant with cpap and is benefiting from it.     If you have any questions or concerns, please don't hesitate to contact my office.    Sincerely,        Geronimo Vera MD

## 2025-04-09 ENCOUNTER — PATIENT MESSAGE (OUTPATIENT)
Dept: FAMILY MEDICINE | Facility: CLINIC | Age: 45
End: 2025-04-09

## 2025-04-09 ENCOUNTER — OFFICE VISIT (OUTPATIENT)
Dept: FAMILY MEDICINE | Facility: CLINIC | Age: 45
End: 2025-04-09
Payer: COMMERCIAL

## 2025-04-09 VITALS
DIASTOLIC BLOOD PRESSURE: 72 MMHG | WEIGHT: 216.25 LBS | HEIGHT: 67 IN | SYSTOLIC BLOOD PRESSURE: 122 MMHG | RESPIRATION RATE: 18 BRPM | BODY MASS INDEX: 33.94 KG/M2 | OXYGEN SATURATION: 95 % | HEART RATE: 78 BPM

## 2025-04-09 DIAGNOSIS — G43.909 ACUTE MIGRAINE: Primary | ICD-10-CM

## 2025-04-09 DIAGNOSIS — E66.01 SEVERE OBESITY (BMI 35.0-39.9) WITH COMORBIDITY: ICD-10-CM

## 2025-04-09 DIAGNOSIS — G43.001 MIGRAINE WITHOUT AURA AND WITH STATUS MIGRAINOSUS, NOT INTRACTABLE: ICD-10-CM

## 2025-04-09 PROBLEM — J41.0 SIMPLE CHRONIC BRONCHITIS: Status: RESOLVED | Noted: 2025-04-09 | Resolved: 2025-04-09

## 2025-04-09 PROBLEM — J41.0 SIMPLE CHRONIC BRONCHITIS: Status: ACTIVE | Noted: 2025-04-09

## 2025-04-09 PROCEDURE — 3051F HG A1C>EQUAL 7.0%<8.0%: CPT | Mod: CPTII,S$GLB,, | Performed by: FAMILY MEDICINE

## 2025-04-09 PROCEDURE — 99999 PR PBB SHADOW E&M-EST. PATIENT-LVL IV: CPT | Mod: PBBFAC,,, | Performed by: FAMILY MEDICINE

## 2025-04-09 PROCEDURE — 3078F DIAST BP <80 MM HG: CPT | Mod: CPTII,S$GLB,, | Performed by: FAMILY MEDICINE

## 2025-04-09 PROCEDURE — 3061F NEG MICROALBUMINURIA REV: CPT | Mod: CPTII,S$GLB,, | Performed by: FAMILY MEDICINE

## 2025-04-09 PROCEDURE — 3074F SYST BP LT 130 MM HG: CPT | Mod: CPTII,S$GLB,, | Performed by: FAMILY MEDICINE

## 2025-04-09 PROCEDURE — 1159F MED LIST DOCD IN RCRD: CPT | Mod: CPTII,S$GLB,, | Performed by: FAMILY MEDICINE

## 2025-04-09 PROCEDURE — 1160F RVW MEDS BY RX/DR IN RCRD: CPT | Mod: CPTII,S$GLB,, | Performed by: FAMILY MEDICINE

## 2025-04-09 PROCEDURE — 3066F NEPHROPATHY DOC TX: CPT | Mod: CPTII,S$GLB,, | Performed by: FAMILY MEDICINE

## 2025-04-09 PROCEDURE — 4010F ACE/ARB THERAPY RXD/TAKEN: CPT | Mod: CPTII,S$GLB,, | Performed by: FAMILY MEDICINE

## 2025-04-09 PROCEDURE — G2211 COMPLEX E/M VISIT ADD ON: HCPCS | Mod: S$GLB,,, | Performed by: FAMILY MEDICINE

## 2025-04-09 PROCEDURE — 3008F BODY MASS INDEX DOCD: CPT | Mod: CPTII,S$GLB,, | Performed by: FAMILY MEDICINE

## 2025-04-09 PROCEDURE — 99214 OFFICE O/P EST MOD 30 MIN: CPT | Mod: S$GLB,,, | Performed by: FAMILY MEDICINE

## 2025-04-09 RX ORDER — UBROGEPANT 100 MG/1
100 TABLET ORAL
Qty: 15 TABLET | Refills: 2 | Status: SHIPPED | OUTPATIENT
Start: 2025-04-09

## 2025-04-09 RX ORDER — NORTRIPTYLINE HYDROCHLORIDE 10 MG/1
20 CAPSULE ORAL NIGHTLY
Qty: 60 CAPSULE | Refills: 5 | Status: SHIPPED | OUTPATIENT
Start: 2025-04-09 | End: 2026-04-09

## 2025-04-09 NOTE — LETTER
April 9, 2025      [address] [address]  Phone: [phone]  Fax: [phone]       Patient: Dimitrios Cazares   YOB: 1980  Date of Visit: 04/09/2025    To Whom It May Concern:    Please excuse Jenny Cazares  from work on 04/09/25 through 04/16/25. If you have any questions or concerns, or if I can be of further assistance, please do not hesitate to contact me.    Sincerely,    JOSE Handy MD

## 2025-04-09 NOTE — PROGRESS NOTES
Subjective:       Patient ID: Dimitrios Cazares is a 44 y.o. male.    Chief Complaint: Follow-up (Pt here for ER f/u. )    Pt is a 44 y.o. male who presents for evaluation and management of   Encounter Diagnoses   Name Primary?    Acute migraine Yes    Migraine without aura and with status migrainosus, not intractable     Severe obesity (BMI 35.0-39.9) with comorbidity    .went to ED last night for acute severe migraines   CT head negative. REsolved with Rx in ED   Upon further review he has about a dozen h/a or more a month. Most of are the migraine variety.   Blood shot tearing eyes at times. N/v and photophobia. Usually tries to go to sleep if he has a h/a     Doing well on current meds. Denies any side effects. Prevention is up to date.  Review of Systems   Eyes:  Positive for photophobia. Negative for visual disturbance.   Gastrointestinal:  Positive for nausea and vomiting.   Neurological:  Positive for headaches. Negative for weakness and numbness.         No Aura        Objective:      Physical Exam  Constitutional:       Appearance: Normal appearance. He is well-developed. He is obese. He is not ill-appearing.   HENT:      Head: Normocephalic and atraumatic.      Right Ear: External ear normal.      Left Ear: External ear normal.      Nose: Nose normal.      Mouth/Throat:      Mouth: Mucous membranes are moist.      Pharynx: No oropharyngeal exudate or posterior oropharyngeal erythema.   Eyes:      General: No scleral icterus.        Right eye: No discharge.         Left eye: No discharge.      Conjunctiva/sclera: Conjunctivae normal.      Pupils: Pupils are equal, round, and reactive to light.   Neck:      Thyroid: No thyromegaly.      Vascular: No JVD.      Trachea: No tracheal deviation.   Cardiovascular:      Rate and Rhythm: Normal rate and regular rhythm.      Heart sounds: Normal heart sounds. No murmur heard.  Pulmonary:      Effort: Pulmonary effort is normal. No respiratory distress.      Breath  sounds: Normal breath sounds. No wheezing or rales.   Chest:      Chest wall: No tenderness.   Abdominal:      General: Bowel sounds are normal. There is no distension.      Palpations: Abdomen is soft. There is no mass.      Tenderness: There is no abdominal tenderness. There is no guarding or rebound.   Musculoskeletal:         General: Normal range of motion.      Cervical back: Normal range of motion and neck supple.      Right lower leg: No edema.      Left lower leg: No edema.   Lymphadenopathy:      Cervical: No cervical adenopathy.   Skin:     General: Skin is warm and dry.   Neurological:      Mental Status: He is alert and oriented to person, place, and time.      Cranial Nerves: No cranial nerve deficit.      Motor: No abnormal muscle tone.      Coordination: Coordination normal.      Deep Tendon Reflexes: Reflexes are normal and symmetric. Reflexes normal.   Psychiatric:         Behavior: Behavior normal.         Thought Content: Thought content normal.         Judgment: Judgment normal.         Assessment:       1. Acute migraine    2. Migraine without aura and with status migrainosus, not intractable    3. Severe obesity (BMI 35.0-39.9) with comorbidity        Plan:   1. Acute migraine  -     ubrogepant (UBRELVY) 100 mg tablet; Take 1 tablet (100 mg total) by mouth as needed for Migraine. If symptoms persist or return, may repeat dose after 2 hours. Maximum: 200 mg per 24 hours  Dispense: 15 tablet; Refill: 2    2. Migraine without aura and with status migrainosus, not intractable  -     ubrogepant (UBRELVY) 100 mg tablet; Take 1 tablet (100 mg total) by mouth as needed for Migraine. If symptoms persist or return, may repeat dose after 2 hours. Maximum: 200 mg per 24 hours  Dispense: 15 tablet; Refill: 2  -     nortriptyline (PAMELOR) 10 MG capsule; Take 2 capsules (20 mg total) by mouth every evening.  Dispense: 60 capsule; Refill: 5    3. Severe obesity (BMI 35.0-39.9) with  comorbidity  Overview:  The patient is asked to make an attempt to improve diet and exercise patterns to aid in medical management of this problem.  Cut out white carbs: bread, rice, pasta, potatoes. Exercise/walk 5x/week for at least 30 minutes  .        RTC 2 weeks. No Work until then.    No follow-ups on file.

## 2025-04-16 ENCOUNTER — OFFICE VISIT (OUTPATIENT)
Dept: FAMILY MEDICINE | Facility: CLINIC | Age: 45
End: 2025-04-16
Payer: COMMERCIAL

## 2025-04-16 VITALS
OXYGEN SATURATION: 95 % | SYSTOLIC BLOOD PRESSURE: 112 MMHG | HEART RATE: 113 BPM | RESPIRATION RATE: 17 BRPM | HEIGHT: 67 IN | DIASTOLIC BLOOD PRESSURE: 70 MMHG | WEIGHT: 210.19 LBS | BODY MASS INDEX: 32.99 KG/M2

## 2025-04-16 DIAGNOSIS — G43.001 MIGRAINE WITHOUT AURA AND WITH STATUS MIGRAINOSUS, NOT INTRACTABLE: Primary | ICD-10-CM

## 2025-04-16 DIAGNOSIS — E11.9 TYPE 2 DIABETES MELLITUS WITHOUT COMPLICATION, WITHOUT LONG-TERM CURRENT USE OF INSULIN: ICD-10-CM

## 2025-04-16 PROCEDURE — G2211 COMPLEX E/M VISIT ADD ON: HCPCS | Mod: S$GLB,,, | Performed by: FAMILY MEDICINE

## 2025-04-16 PROCEDURE — 3051F HG A1C>EQUAL 7.0%<8.0%: CPT | Mod: CPTII,S$GLB,, | Performed by: FAMILY MEDICINE

## 2025-04-16 PROCEDURE — 3074F SYST BP LT 130 MM HG: CPT | Mod: CPTII,S$GLB,, | Performed by: FAMILY MEDICINE

## 2025-04-16 PROCEDURE — 4010F ACE/ARB THERAPY RXD/TAKEN: CPT | Mod: CPTII,S$GLB,, | Performed by: FAMILY MEDICINE

## 2025-04-16 PROCEDURE — 1159F MED LIST DOCD IN RCRD: CPT | Mod: CPTII,S$GLB,, | Performed by: FAMILY MEDICINE

## 2025-04-16 PROCEDURE — 99999 PR PBB SHADOW E&M-EST. PATIENT-LVL IV: CPT | Mod: PBBFAC,,, | Performed by: FAMILY MEDICINE

## 2025-04-16 PROCEDURE — 3078F DIAST BP <80 MM HG: CPT | Mod: CPTII,S$GLB,, | Performed by: FAMILY MEDICINE

## 2025-04-16 PROCEDURE — 99214 OFFICE O/P EST MOD 30 MIN: CPT | Mod: S$GLB,,, | Performed by: FAMILY MEDICINE

## 2025-04-16 PROCEDURE — 3061F NEG MICROALBUMINURIA REV: CPT | Mod: CPTII,S$GLB,, | Performed by: FAMILY MEDICINE

## 2025-04-16 PROCEDURE — 3008F BODY MASS INDEX DOCD: CPT | Mod: CPTII,S$GLB,, | Performed by: FAMILY MEDICINE

## 2025-04-16 PROCEDURE — 3066F NEPHROPATHY DOC TX: CPT | Mod: CPTII,S$GLB,, | Performed by: FAMILY MEDICINE

## 2025-04-16 RX ORDER — IBUPROFEN 800 MG/1
800 TABLET ORAL
COMMUNITY
Start: 2025-04-09

## 2025-04-16 NOTE — PROGRESS NOTES
Subjective:       Patient ID: Dimitrios Cazares is a 44 y.o. male.    Chief Complaint: Follow-up (Pt here for 1 wk f/u.)    Pt is a 44 y.o. male who presents for evaluation and management of   Encounter Diagnoses   Name Primary?    Migraine without aura and with status migrainosus, not intractable Yes    Type 2 diabetes mellitus without complication, without long-term current use of insulin    .h/a improved   Had one in past week but resolved with the Ubrelvy     Doing well on current meds. Denies any side effects. Prevention is up to date.    Review of Systems   Constitutional:  Negative for fever.   Respiratory:  Negative for shortness of breath.    Cardiovascular:  Negative for chest pain.   Gastrointestinal:  Negative for anal bleeding and blood in stool.   Genitourinary:  Negative for dysuria.   Neurological:  Positive for headaches. Negative for dizziness and light-headedness.       Objective:      Physical Exam  Constitutional:       Appearance: Normal appearance. He is well-developed. He is not ill-appearing.   HENT:      Head: Normocephalic and atraumatic.      Right Ear: External ear normal.      Left Ear: External ear normal.      Nose: Nose normal.      Mouth/Throat:      Mouth: Mucous membranes are moist.      Pharynx: No oropharyngeal exudate or posterior oropharyngeal erythema.   Eyes:      General: No scleral icterus.        Right eye: No discharge.         Left eye: No discharge.      Conjunctiva/sclera: Conjunctivae normal.      Pupils: Pupils are equal, round, and reactive to light.   Neck:      Thyroid: No thyromegaly.      Vascular: No JVD.      Trachea: No tracheal deviation.   Cardiovascular:      Rate and Rhythm: Normal rate and regular rhythm.      Heart sounds: Normal heart sounds. No murmur heard.  Pulmonary:      Effort: Pulmonary effort is normal. No respiratory distress.      Breath sounds: Normal breath sounds. No wheezing or rales.   Chest:      Chest wall: No tenderness.    Abdominal:      General: Bowel sounds are normal. There is no distension.      Palpations: Abdomen is soft. There is no mass.      Tenderness: There is no abdominal tenderness. There is no guarding or rebound.   Musculoskeletal:         General: Normal range of motion.      Cervical back: Normal range of motion and neck supple.      Right lower leg: No edema.      Left lower leg: No edema.   Lymphadenopathy:      Cervical: No cervical adenopathy.   Skin:     General: Skin is warm and dry.   Neurological:      Mental Status: He is alert and oriented to person, place, and time.      Cranial Nerves: No cranial nerve deficit.      Motor: No abnormal muscle tone.      Coordination: Coordination normal.      Deep Tendon Reflexes: Reflexes are normal and symmetric. Reflexes normal.   Psychiatric:         Behavior: Behavior normal.         Thought Content: Thought content normal.         Judgment: Judgment normal.         Assessment:       1. Migraine without aura and with status migrainosus, not intractable    2. Type 2 diabetes mellitus without complication, without long-term current use of insulin        Plan:   1. Migraine without aura and with status migrainosus, not intractable  Overview:  Probably cluster h/a based on his symptoms (unilateral eye injection tearing).  TCA nightly   Ubrelvy prn          2. Type 2 diabetes mellitus without complication, without long-term current use of insulin  Overview:  Lab Results   Component Value Date    HGBA1C 7.8 (H) 03/19/2025     Mounjaro 2.5         Cleared to go back to work     No follow-ups on file.

## 2025-04-16 NOTE — LETTER
[address] [address]  Phone: [phone]  Fax: [phone] April 16, 2025     Patient: Dimitrios Cazares   YOB: 1980   Date of Visit: 4/16/2025       To Whom It May Concern:    As of now, Fredy is medically cleared to return to work. He has no restrictions.     If you have any questions or concerns, please don't hesitate to contact my office.    Sincerely,        Geronimo Vera MD

## 2025-04-24 ENCOUNTER — TELEPHONE (OUTPATIENT)
Dept: PHARMACY | Facility: CLINIC | Age: 45
End: 2025-04-24
Payer: COMMERCIAL

## 2025-04-24 NOTE — TELEPHONE ENCOUNTER
[facility] Chart Review & Patient Outreach Details For Medication Adherence Project    Reason for Outreach Encounter: 3rd Party payor non-compliance report ([employer], [employer], C, etc)  2.  Patient Outreach Method: Reviewed Patient Chart  3.   Medication in question: rosuvastatin   LAST FILLED: 4/14/25 for 90 day supply  Hyperlipidemia Medications              rosuvastatin (CRESTOR) 20 MG tablet Take 1 tablet (20 mg total) by mouth every evening.               4.  Reviewed and or Updates Made To: Patient Chart  5. Outreach Outcomes and/or actions taken: Patient filled medication and is on track to be adherent

## 2025-05-05 ENCOUNTER — OFFICE VISIT (OUTPATIENT)
Dept: PAIN MEDICINE | Facility: CLINIC | Age: 45
End: 2025-05-05
Payer: COMMERCIAL

## 2025-05-05 VITALS
HEART RATE: 97 BPM | BODY MASS INDEX: 32.96 KG/M2 | DIASTOLIC BLOOD PRESSURE: 61 MMHG | WEIGHT: 210 LBS | SYSTOLIC BLOOD PRESSURE: 126 MMHG | HEIGHT: 67 IN | OXYGEN SATURATION: 92 %

## 2025-05-05 DIAGNOSIS — M50.30 DEGENERATION OF INTERVERTEBRAL DISC OF CERVICAL REGION WITH OSTEOPHYTE OF CERVICAL VERTEBRA: ICD-10-CM

## 2025-05-05 DIAGNOSIS — M54.2 CHRONIC NECK PAIN: ICD-10-CM

## 2025-05-05 DIAGNOSIS — G89.29 CHRONIC NECK PAIN: ICD-10-CM

## 2025-05-05 DIAGNOSIS — M25.78 DEGENERATION OF INTERVERTEBRAL DISC OF CERVICAL REGION WITH OSTEOPHYTE OF CERVICAL VERTEBRA: ICD-10-CM

## 2025-05-05 DIAGNOSIS — M47.812 ARTHROPATHY OF CERVICAL FACET JOINT: Primary | ICD-10-CM

## 2025-05-05 DIAGNOSIS — M54.12 CERVICAL RADICULITIS: ICD-10-CM

## 2025-05-05 PROCEDURE — 3051F HG A1C>EQUAL 7.0%<8.0%: CPT | Mod: CPTII,S$GLB,, | Performed by: ANESTHESIOLOGY

## 2025-05-05 PROCEDURE — 3061F NEG MICROALBUMINURIA REV: CPT | Mod: CPTII,S$GLB,, | Performed by: ANESTHESIOLOGY

## 2025-05-05 PROCEDURE — 3078F DIAST BP <80 MM HG: CPT | Mod: CPTII,S$GLB,, | Performed by: ANESTHESIOLOGY

## 2025-05-05 PROCEDURE — 1160F RVW MEDS BY RX/DR IN RCRD: CPT | Mod: CPTII,S$GLB,, | Performed by: ANESTHESIOLOGY

## 2025-05-05 PROCEDURE — 4010F ACE/ARB THERAPY RXD/TAKEN: CPT | Mod: CPTII,S$GLB,, | Performed by: ANESTHESIOLOGY

## 2025-05-05 PROCEDURE — 3008F BODY MASS INDEX DOCD: CPT | Mod: CPTII,S$GLB,, | Performed by: ANESTHESIOLOGY

## 2025-05-05 PROCEDURE — 3074F SYST BP LT 130 MM HG: CPT | Mod: CPTII,S$GLB,, | Performed by: ANESTHESIOLOGY

## 2025-05-05 PROCEDURE — 3066F NEPHROPATHY DOC TX: CPT | Mod: CPTII,S$GLB,, | Performed by: ANESTHESIOLOGY

## 2025-05-05 PROCEDURE — 99999 PR PBB SHADOW E&M-EST. PATIENT-LVL V: CPT | Mod: PBBFAC,,, | Performed by: ANESTHESIOLOGY

## 2025-05-05 PROCEDURE — 99214 OFFICE O/P EST MOD 30 MIN: CPT | Mod: S$GLB,,, | Performed by: ANESTHESIOLOGY

## 2025-05-05 PROCEDURE — 1159F MED LIST DOCD IN RCRD: CPT | Mod: CPTII,S$GLB,, | Performed by: ANESTHESIOLOGY

## 2025-05-05 NOTE — PROGRESS NOTES
EST patient evaluation  [facility]    Dimitrios Cazares  : 1980  Date: 2025     CHIEF COMPLAINT:  Neck Pain  Referring Physician: No ref. provider found  Primary Care Physician: Geronimo Vera MD    HPI:  This is a 44 y.o. male with a chief complaint of Neck Pain  . The patient has Past medical history/Past surgical history of  hypertension, hyperlipidemia, diabetes, sleep apnea, smoker    Patient was evaluated and referred by PCP for Neck pain.  He had previous cervical epidural steroid injection outside [facility] with moderate improvement in pain and functionality in .  MRI cervical spine was completed  outside [facility].    Interval History 10/31/2024:  Dimitrios Cazares is here for procedure follow up visit after cervical epidural steroid injection at C7-T1,  patient reported 70% improvement in pain and functionality. He was also started on gabapentin 300 mg, BID,  with a significant improvement in his pain after starting gabapentin.  Current pain score: 2/10    Interval History 2025:  Dimitrios Cazares is here for  six-months follow up visit, last cervical epidural steroid injection was in 10/2024      Current pain score: ***/10      Diabetic: Yes    Anticogualtion drugs: None    Allergy To Iodine: No    Currently on Antibiotic: No    Current Description of Pain Symptoms:    History of Recent Fall or Trauma: No   Onset: Chronic, started 1 yr  Pain Location: neck and CH  Radiates/associated symptoms: bilat shoulder blades, no significant bilateral upper extremity radiation   Bilateral  hand numbness due to carpal tunnel  Pain is Getting worse over the last 3 months    The pain is described as aching, burning, sharp, shooting, stabbing, and throbbing.   Exacerbating factors:  neck extension  Mitigating factors none.   Symptoms interfere with daily activity, sleeping.   The patient feels like symptoms have been  improving  Patient denies night fever/night  sweats, urinary incontinence, bowel incontinence, significant weight loss, significant motor weakness, and loss of sensations.    Pain score:  Best: 0/10  Worst: 7/10    Current pain medications:  Ibuprofen 800mg, PRN     FLUoxetine 20 MG capsule daily     gabapentin 300mg BID  Current Narcotics/Opioid /benzo Medications:  Opioids- None  Benzodiazepines: No    UDS:  NA    PDMP:  Reviewed and consistent with medication use as prescribed.     Previous Chronic Pain Treatment History:  Physical Therapy/HEP/Physician Lead Exercise Program:  Over the past 12 months, Patient has done  6+ sessions.  PT response: Moderately Helpful.   Dates of the PT sessions: 2023.  Is patient participating in home exercise program (HEP)/ physician led exercise program: Yes.    Non-interventional Pain Therapy:  []Chiropractor.   [x]Dry needle: helps  []TENS unit.  []Heat/ICE.  []Back Brace.    Medications previously tried:  NSAIDs: Diclofenac  Topical Agent: Yes  TCA/SSRI/SNRI: SSRI: Fluoxetine (Prozac)  Anti-convulsants: None  Muscle Relaxants: Flexeril (Cyclobenzaprine)  Opioids- None.    Interventional Pain Procedures:  SHIRA at C7-T1 2022 [facility] made the pain tolerable   10/04/2024: cervical epidural steroid injection at C7-T1 - 70% relief  Previous spine/Relevant joint surgery:  none  Surgical History:   has a past surgical history that includes Gallbladder surgery (08/20/2019); Hernia repair (2003); Abdominal hernia repair (08/20/2019); Howe tooth extraction (1997); and Epidural steroid injection into cervical spine (N/A, 10/4/2024).  Medical History:   has a past medical history of Carpal tunnel syndrome on both sides and Hypertension.  Family History:  family history is not on file.  Allergies:  Aspirin   Social History/SUBSTANCE ABUSE HISTORY:  Personal history of substance abuse: No   reports that he has been smoking cigarettes. He has been exposed to tobacco smoke. He has never used smokeless tobacco. He reports current  "alcohol use. He reports that he does not use drugs.  LABS:  CBC  Lab Results   Component Value Date    WBC 7.72 08/26/2024    HGB 15.5 08/26/2024    HCT 45.0 08/26/2024     Coagulation Profile   Lab Results   Component Value Date     08/26/2024     No results found for: "PT", "PTT", "INR"  CMP:  BMP  Lab Results   Component Value Date     03/19/2025    K 4.4 03/19/2025     03/19/2025    CO2 26 03/19/2025    BUN 17 03/19/2025    CREATININE 0.9 03/19/2025    CALCIUM 8.8 03/19/2025    ANIONGAP 8 03/19/2025    EGFRNORACEVR >60 03/19/2025     Lab Results   Component Value Date    ALT 46 (H) 03/19/2025    AST 23 03/19/2025    ALKPHOS 90 03/19/2025    BILITOT 0.4 03/19/2025     HGBA1C:  Lab Results   Component Value Date    HGBA1C 7.8 (H) 03/19/2025     ROS:    Review of Systems   GENERAL:  No weight loss, malaise or fevers.  HEENT:   No recent changes in vision or hearing  NECK:  Negative for lumps, no difficulty with swallowing.  RESPIRATORY:  Negative for cough, wheezing or shortness of breath, patient denies any recent URI.  CARDIOVASCULAR:  Negative for chest pain or palpitations.  GI:  Negative for abdominal discomfort, blood in stools or black stools or change in bowel habits.  MUSCULOSKELETAL:  See HPI.  SKIN:  Negative for lesions, rash, and itching.  PSYCH:  No mood disorder or recent psychosocial stressors.   HEMATOLOGY/LYMPHOLOGY:  See the blood thinner sectioned in HPI.  NEURO:  See HPI  All other reviewed and negative other than HPI.    PHYSICAL EXAM:  VITALS: /61 (BP Location: Right arm, Patient Position: Sitting)   Pulse 97   Ht 5' 6.5" (1.689 m)   Wt 95.3 kg (210 lb)   SpO2 (!) 92%   BMI 33.39 kg/m²   Body mass index is 33.39 kg/m².  GENERAL: Well appearing, in no acute distress, alert and oriented x3, answers questions appropriately.   PSYCH: Flat affect.  SKIN: Skin color, texture, turgor normal, no rashes or lesions.  HEAD/FACE:  Normocephalic, atraumatic. Cranial nerves " grossly intact.  CV: Regular rate  PULM: No evidence of respiratory difficulty, symmetric chest rise.  GI:  Soft and non-Distended.  NECK: ( mild positive) pain to palpation over the cervical paraspinous muscles. Spurling: +. (+) pain with neck extension than flexion, Muscle strength in RT UE 5/5 and Left UE 5/5, Hand  5/5 bilaterally   SHOULDERS: No gross deformity or atrophy noted.   ROM: Full AROM bilaterally. No focal TTP   Rotator cuff exam NARANJO: Unremarkable bilaterally.   EMPTY CAN SIGN: Unremarkable bilaterally.   CROSS-ARM ADDUCTION: No significant AC joint pain bilaterally.  GAIT:   normal    DIAGNOSTIC STUDIES AND MEDICAL RECORDS REVIEW:  I have personally reviewed and interpreted relevant radiology reports and reviewed relevant records from other services in the EMR.     -X-ray cervical spine 08/2024  after office visit   Mild reversal the normal cervical lordosis. Vertebral body heights are maintained. No abnormal movement with flexion or extension to suggest ligamentous instability. There is disc space narrowing with endplate sclerosis and marginal osteophytosis at the C4-5, C5-6 and C6-7 levels. No evidence of lytic or blastic lesions. Soft tissues are unremarkable.     - MRI cervical spine 11/2022:  MRI cervical spine showed moderate central canal stenosis at C4-C5 associated with disc extrusion,  Multilevel degenerative disc disease at C5-C6 and C6-C7, right foramina stenosis is greatest at C5-C6 and C6-C7    Clinical Impression:  This is a pleasant 44 y.o. male patient with PMH/PSH of hypertension, hyperlipidemia, diabetes, sleep apnea, smoker, presenting with Neck pain, cervicogenic headache, bilateral shoulder pain, no upper extremity radiculitis, no weakness, no bilateral shoulder pathology, has a MRI cervical spine outside [facility] that showed moderate central canal stenosis at C4-C5 associated with disc extrusion, had good pain relief from cervical epidural steroid injection 2022,  he  completed more than 6 weeks of physician led exercise program/ home exercise in the last 6 months, not interested in participating in formal physical therapy, he had cervical epidural steroid injection which provided him with significant improvement in pain and functionality,  he has a only mild stiffness in his neck    Encounter Diagnosis:  Dimitrios Cazares is a 44 y.o. male with the following diagnoses based on history, exam, and imaging:  There are no diagnoses linked to this encounter.    Treatment Plan:    Diagnostics/Referrals:  HEP    Medications:    NSAIDs: DC Diclofenac  Topical Agent: NA  TCA/SSRI/SNRI: SSRI: Fluoxetine (Prozac)  Anti-convulsants:  continue gabapentin 300 mg BID,  patient was provided with a refill  Muscle Relaxants: None  Opioids: None    Interventional Therapy:   may consider repeating cervical epidural  Sedation: Oral Sedation.   Anticogualtion drugs: None-Clearance to stop Blood thinner: NA     Regarding the above interventions, the patient has been educated regarding the risks (including bleeding, infection, increased pain, nerve damage, or allergic reaction), benefits, and alternatives. The patient states he understands and is eager to proceed.    Physical Rehabilitation: Physician led exercise program and home exercise    Patient Education: Counseled patient regarding the importance of activity modification, I have stressed the importance of physical activity and a home exercise plan to help with pain and improve health.    Follow-up: RTC 6 months    May consider:  upper level cervical medial branch block    Corbin Pickering MD  Anesthesiologist  Interventional Pain Medicine  05/05/2025    Disclaimer:  This note was prepared using voice recognition system and is likely to have sound alike errors that may have been overlooked even after proof reading.  Please call me with any questions.

## 2025-05-05 NOTE — PROGRESS NOTES
EST patient evaluation  [facility]    Dimitrios Cazares  : 1980  Date: 2025     CHIEF COMPLAINT:  Neck Pain  Referring Physician: No ref. provider found  Primary Care Physician: Geronimo Vera MD    HPI:  This is a 44 y.o. male with a chief complaint of Neck Pain  . The patient has Past medical history/Past surgical history of  hypertension, hyperlipidemia, diabetes, sleep apnea, smoker    Patient was evaluated and referred by PCP for Neck pain.  He had previous cervical epidural steroid injection outside [facility] with moderate improvement in pain and functionality in .  MRI cervical spine was completed  outside [facility].    Interval History 10/31/2024:  Dimitrios Cazares is here for procedure follow up visit after cervical epidural steroid injection at C7-T1,  patient reported 70% improvement in pain and functionality. He was also started on gabapentin 300 mg, BID,  with a significant improvement in his pain after starting gabapentin.  Current pain score: 2/10    Interval History 2025:  Dimitrios Cazares is here for  six-months follow up visit, last cervical epidural steroid injection was in 10/2024, he continues to have pain relief,  he continues to take gabapentin as prescribed, no reported side effects, no major complaints  Current pain score: 0/10      Diabetic: Yes    Anticogualtion drugs: None    Allergy To Iodine: No    Currently on Antibiotic: No    Current Description of Pain Symptoms, similar to previous:    History of Recent Fall or Trauma: No   Onset: Chronic, started 1 yr  Pain Location: neck and CH  Radiates/associated symptoms: bilat shoulder blades, no significant bilateral upper extremity radiation   Bilateral  hand numbness due to carpal tunnel  Pain is Getting worse over the last 3 months    The pain is described as aching, burning, sharp, shooting, stabbing, and throbbing.   Exacerbating factors:  neck extension  Mitigating factors  none.   Symptoms interfere with daily activity, sleeping.   The patient feels like symptoms have been  improving  Patient denies night fever/night sweats, urinary incontinence, bowel incontinence, significant weight loss, significant motor weakness, and loss of sensations.    Pain score:  Best: 0/10  Worst: 7/10    Current pain medications:  Ibuprofen 800mg, PRN     FLUoxetine 20 MG capsule daily     gabapentin 300mg BID  Current Narcotics/Opioid /benzo Medications:  Opioids- None  Benzodiazepines: No    UDS:  NA    PDMP:  Reviewed and consistent with medication use as prescribed.     Previous Chronic Pain Treatment History:  Physical Therapy/HEP/Physician Lead Exercise Program:  Over the past 12 months, Patient has done  6+ sessions.  PT response: Moderately Helpful.   Dates of the PT sessions: 2023.  Is patient participating in home exercise program (HEP)/ physician led exercise program: Yes.    Non-interventional Pain Therapy:  []Chiropractor.   [x]Dry needle: helps  []TENS unit.  []Heat/ICE.  []Back Brace.    Medications previously tried:  NSAIDs: Diclofenac: SE: acid reflux   Topical Agent: Yes  TCA/SSRI/SNRI: SSRI: Fluoxetine (Prozac)  Anti-convulsants: None  Muscle Relaxants: Flexeril (Cyclobenzaprine)  Opioids- None.    Interventional Pain Procedures:  SHIRA at C7-T1 2022 [facility] made the pain tolerable   10/04/2024: cervical epidural steroid injection at C7-T1 - 70% relief> more than 6 months of relief  Previous spine/Relevant joint surgery:  none  Surgical History:   has a past surgical history that includes Gallbladder surgery (08/20/2019); Hernia repair (2003); Abdominal hernia repair (08/20/2019); [place] tooth extraction (1997); and Epidural steroid injection into cervical spine (N/A, 10/4/2024).  Medical History:   has a past medical history of Carpal tunnel syndrome on both sides and Hypertension.  Family History:  family history is not on file.  Allergies:  Aspirin   Social History/SUBSTANCE ABUSE  "HISTORY:  Personal history of substance abuse: No   reports that he has been smoking cigarettes. He has been exposed to tobacco smoke. He has never used smokeless tobacco. He reports current alcohol use. He reports that he does not use drugs.  LABS:  CBC  Lab Results   Component Value Date    WBC 7.72 08/26/2024    HGB 15.5 08/26/2024    HCT 45.0 08/26/2024     Coagulation Profile   Lab Results   Component Value Date     08/26/2024     No results found for: "PT", "PTT", "INR"  CMP:  BMP  Lab Results   Component Value Date     03/19/2025    K 4.4 03/19/2025     03/19/2025    CO2 26 03/19/2025    BUN 17 03/19/2025    CREATININE 0.9 03/19/2025    CALCIUM 8.8 03/19/2025    ANIONGAP 8 03/19/2025    EGFRNORACEVR >60 03/19/2025     Lab Results   Component Value Date    ALT 46 (H) 03/19/2025    AST 23 03/19/2025    ALKPHOS 90 03/19/2025    BILITOT 0.4 03/19/2025     HGBA1C:  Lab Results   Component Value Date    HGBA1C 7.8 (H) 03/19/2025     ROS:    Review of Systems   GENERAL:  No weight loss, malaise or fevers.  HEENT:   No recent changes in vision or hearing  NECK:  Negative for lumps, no difficulty with swallowing.  RESPIRATORY:  Negative for cough, wheezing or shortness of breath, patient denies any recent URI.  CARDIOVASCULAR:  Negative for chest pain or palpitations.  GI:  Negative for abdominal discomfort, blood in stools or black stools or change in bowel habits.  MUSCULOSKELETAL:  See HPI.  SKIN:  Negative for lesions, rash, and itching.  PSYCH:  No mood disorder or recent psychosocial stressors.   HEMATOLOGY/LYMPHOLOGY:  See the blood thinner sectioned in HPI.  NEURO:  See HPI  All other reviewed and negative other than HPI.    PHYSICAL EXAM:  VITALS: /61 (BP Location: Right arm, Patient Position: Sitting)   Pulse 97   Ht 5' 6.5" (1.689 m)   Wt 95.3 kg (210 lb)   SpO2 (!) 92%   BMI 33.39 kg/m²   Body mass index is 33.39 kg/m².  GENERAL: Well appearing, in no acute distress, alert and " oriented x3, answers questions appropriately.   PSYCH: Flat affect.  SKIN: Skin color, texture, turgor normal, no rashes or lesions.  HEAD/FACE:  Normocephalic, atraumatic. Cranial nerves grossly intact.  CV: Regular rate  PULM: No evidence of respiratory difficulty, symmetric chest rise.  GI:  Soft and non-Distended.  NECK: ( mild positive) pain to palpation over the cervical paraspinous muscles. Spurling: +. (+) pain with neck extension than flexion, Muscle strength in RT UE 5/5 and Left UE 5/5, Hand  5/5 bilaterally   SHOULDERS: No gross deformity or atrophy noted.   ROM: Full AROM bilaterally. No focal TTP   Rotator cuff exam NARANJO: Unremarkable bilaterally.   EMPTY CAN SIGN: Unremarkable bilaterally.   CROSS-ARM ADDUCTION: No significant AC joint pain bilaterally.  GAIT:   normal    DIAGNOSTIC STUDIES AND MEDICAL RECORDS REVIEW:  I have personally reviewed and interpreted relevant radiology reports and reviewed relevant records from other services in the EMR.     -X-ray cervical spine 08/2024  after office visit   Mild reversal the normal cervical lordosis. Vertebral body heights are maintained. No abnormal movement with flexion or extension to suggest ligamentous instability. There is disc space narrowing with endplate sclerosis and marginal osteophytosis at the C4-5, C5-6 and C6-7 levels. No evidence of lytic or blastic lesions. Soft tissues are unremarkable.     - MRI cervical spine 11/2022:  MRI cervical spine showed moderate central canal stenosis at C4-C5 associated with disc extrusion,  Multilevel degenerative disc disease at C5-C6 and C6-C7, right foramina stenosis is greatest at C5-C6 and C6-C7    Clinical Impression:  This is a pleasant 44 y.o. male patient with PMH/PSH of hypertension, hyperlipidemia, diabetes, sleep apnea, smoker, presenting with Neck pain, cervicogenic headache, bilateral shoulder pain, no upper extremity radiculitis, no weakness, no bilateral shoulder pathology, has a MRI  cervical spine outside [facility] that showed moderate central canal stenosis at C4-C5 associated with disc extrusion, had good pain relief from cervical epidural steroid injection ,  he completed more than 6 weeks of physician led exercise program/ home exercise in the last 6 months, not interested in participating in formal physical therapy, he had cervical epidural steroid injection which provided him with significant improvement in pain and functionality    Encounter Diagnosis:  Dimitrios Cazares is a 44 y.o. male with the following diagnoses based on history, exam, and imagin. Arthropathy of cervical facet joint    2. Cervical radiculitis    3. Chronic neck pain    4. Degeneration of intervertebral disc of cervical region with osteophyte of cervical vertebra    Treatment Plan:    Diagnostics/Referrals:  HEP    Medications:    NSAIDs: DC Diclofenac  Topical Agent: NA  TCA/SSRI/SNRI: SSRI: Fluoxetine (Prozac)  Anti-convulsants:  continue gabapentin 300 mg BID,   may call for refill  Muscle Relaxants: None  Opioids: None    Interventional Therapy:   may consider repeating cervical epidural  Sedation: Oral Sedation.   Anticoagulation drugs: None-Clearance to stop Blood thinner: NA     Physical Rehabilitation: Physician led exercise program and home exercise    Patient Education: Counseled patient regarding the importance of activity modification, I have stressed the importance of physical activity and a home exercise plan to help with pain and improve health.    Follow-up: RTC 6 months    May consider:  upper level cervical medial branch block    Corbin Pickering MD  Anesthesiologist  Interventional Pain Medicine  2025    Disclaimer:  This note was prepared using voice recognition system and is likely to have sound alike errors that may have been overlooked even after proof reading.  Please call me with any questions.

## 2025-05-05 NOTE — PROGRESS NOTES
EST patient evaluation  [facility]    Dimitrios Cazares  : 1980  Date: 2025     CHIEF COMPLAINT:  No chief complaint on file.  Referring Physician: No ref. provider found  Primary Care Physician: Geronimo Vera MD    HPI:  This is a 44 y.o. male with a chief complaint of No chief complaint on file.  . The patient has Past medical history/Past surgical history of  hypertension, hyperlipidemia, diabetes, sleep apnea, smoker    Patient was evaluated and referred by PCP for Neck pain.  He had previous cervical epidural steroid injection outside [facility] with moderate improvement in pain and functionality in .  MRI cervical spine was completed  outside [facility].    Interval History 10/31/2024:  Dimitrios Cazares is here for procedure follow up visit after cervical epidural steroid injection at C7-T1,  patient reported 70% improvement in pain and functionality. He was also started on gabapentin 300 mg, BID,  with a significant improvement in his pain after starting gabapentin.  Current pain score: 2/10    Interval History 2025:  Dimitrios Cazares is here for  six-months follow up visit, last cervical epidural steroid injection was in 10/2024      Current pain score: ***/10      Diabetic: Yes    Anticogualtion drugs: None    Allergy To Iodine: No    Currently on Antibiotic: No    Current Description of Pain Symptoms:    History of Recent Fall or Trauma: No   Onset: Chronic, started 1 yr  Pain Location: neck and CH  Radiates/associated symptoms: bilat shoulder blades, no significant bilateral upper extremity radiation   Bilateral  hand numbness due to carpal tunnel  Pain is Getting worse over the last 3 months    The pain is described as aching, burning, sharp, shooting, stabbing, and throbbing.   Exacerbating factors:  neck extension  Mitigating factors none.   Symptoms interfere with daily activity, sleeping.   The patient feels like symptoms have been   improving  Patient denies night fever/night sweats, urinary incontinence, bowel incontinence, significant weight loss, significant motor weakness, and loss of sensations.    Pain score:  Best: 0/10  Worst: 7/10    Current pain medications:    diclofenac (VOLTAREN) 75 MG EC tablet BID    FLUoxetine 20 MG capsule daily     gabapentin 300mg BID  Current Narcotics/Opioid /benzo Medications:  Opioids- None  Benzodiazepines: No    UDS:  NA    PDMP:  Reviewed and consistent with medication use as prescribed.     Previous Chronic Pain Treatment History:  Physical Therapy/HEP/Physician Lead Exercise Program:  Over the past 12 months, Patient has done  6+ sessions.  PT response: Moderately Helpful.   Dates of the PT sessions: 2023.  Is patient participating in home exercise program (HEP)/ physician led exercise program: Yes.    Non-interventional Pain Therapy:  []Chiropractor.   [x]Dry needle: helps  []TENS unit.  []Heat/ICE.  []Back Brace.    Medications previously tried:  NSAIDs: Diclofenac  Topical Agent: Yes  TCA/SSRI/SNRI: SSRI: Fluoxetine (Prozac)  Anti-convulsants: None  Muscle Relaxants: Flexeril (Cyclobenzaprine)  Opioids- None.    Interventional Pain Procedures:  SHIRA at C7-T1 2022 [facility] made the pain tolerable   10/04/2024: cervical epidural steroid injection at C7-T1 - 70% relief  Previous spine/Relevant joint surgery:  none  Surgical History:   has a past surgical history that includes Gallbladder surgery (08/20/2019); Hernia repair (2003); Abdominal hernia repair (08/20/2019); [place] tooth extraction (1997); and Epidural steroid injection into cervical spine (N/A, 10/4/2024).  Medical History:   has a past medical history of Carpal tunnel syndrome on both sides and Hypertension.  Family History:  family history is not on file.  Allergies:  Aspirin   Social History/SUBSTANCE ABUSE HISTORY:  Personal history of substance abuse: No   reports that he has been smoking cigarettes. He has been exposed to tobacco  "smoke. He has never used smokeless tobacco. He reports current alcohol use. He reports that he does not use drugs.  LABS:  CBC  Lab Results   Component Value Date    WBC 7.72 08/26/2024    HGB 15.5 08/26/2024    HCT 45.0 08/26/2024     Coagulation Profile   Lab Results   Component Value Date     08/26/2024     No results found for: "PT", "PTT", "INR"  CMP:  BMP  Lab Results   Component Value Date     03/19/2025    K 4.4 03/19/2025     03/19/2025    CO2 26 03/19/2025    BUN 17 03/19/2025    CREATININE 0.9 03/19/2025    CALCIUM 8.8 03/19/2025    ANIONGAP 8 03/19/2025    EGFRNORACEVR >60 03/19/2025     Lab Results   Component Value Date    ALT 46 (H) 03/19/2025    AST 23 03/19/2025    ALKPHOS 90 03/19/2025    BILITOT 0.4 03/19/2025     HGBA1C:  Lab Results   Component Value Date    HGBA1C 7.8 (H) 03/19/2025     ROS:    Review of Systems   GENERAL:  No weight loss, malaise or fevers.  HEENT:   No recent changes in vision or hearing  NECK:  Negative for lumps, no difficulty with swallowing.  RESPIRATORY:  Negative for cough, wheezing or shortness of breath, patient denies any recent URI.  CARDIOVASCULAR:  Negative for chest pain or palpitations.  GI:  Negative for abdominal discomfort, blood in stools or black stools or change in bowel habits.  MUSCULOSKELETAL:  See HPI.  SKIN:  Negative for lesions, rash, and itching.  PSYCH:  No mood disorder or recent psychosocial stressors.   HEMATOLOGY/LYMPHOLOGY:  See the blood thinner sectioned in HPI.  NEURO:  See HPI  All other reviewed and negative other than HPI.    PHYSICAL EXAM:  VITALS: There were no vitals taken for this visit.  There is no height or weight on file to calculate BMI.  GENERAL: Well appearing, in no acute distress, alert and oriented x3, answers questions appropriately.   PSYCH: Flat affect.  SKIN: Skin color, texture, turgor normal, no rashes or lesions.  HEAD/FACE:  Normocephalic, atraumatic. Cranial nerves grossly intact.  CV: Regular " rate  PULM: No evidence of respiratory difficulty, symmetric chest rise.  GI:  Soft and non-Distended.  NECK: ( mild positive) pain to palpation over the cervical paraspinous muscles. Spurling: +. (+) pain with neck extension than flexion, Muscle strength in RT UE 5/5 and Left UE 5/5, Hand  5/5 bilaterally   SHOULDERS: No gross deformity or atrophy noted.   ROM: Full AROM bilaterally. No focal TTP   Rotator cuff exam NARANJO: Unremarkable bilaterally.   EMPTY CAN SIGN: Unremarkable bilaterally.   CROSS-ARM ADDUCTION: No significant AC joint pain bilaterally.  GAIT:   normal    DIAGNOSTIC STUDIES AND MEDICAL RECORDS REVIEW:  I have personally reviewed and interpreted relevant radiology reports and reviewed relevant records from other services in the EMR.     -X-ray cervical spine 08/2024  after office visit   Mild reversal the normal cervical lordosis. Vertebral body heights are maintained. No abnormal movement with flexion or extension to suggest ligamentous instability. There is disc space narrowing with endplate sclerosis and marginal osteophytosis at the C4-5, C5-6 and C6-7 levels. No evidence of lytic or blastic lesions. Soft tissues are unremarkable.     - MRI cervical spine 11/2022:  MRI cervical spine showed moderate central canal stenosis at C4-C5 associated with disc extrusion,  Multilevel degenerative disc disease at C5-C6 and C6-C7, right foramina stenosis is greatest at C5-C6 and C6-C7    Clinical Impression:  This is a pleasant 44 y.o. male patient with PMH/PSH of hypertension, hyperlipidemia, diabetes, sleep apnea, smoker, presenting with Neck pain, cervicogenic headache, bilateral shoulder pain, no upper extremity radiculitis, no weakness, no bilateral shoulder pathology, has a MRI cervical spine outside [facility] that showed moderate central canal stenosis at C4-C5 associated with disc extrusion, had good pain relief from cervical epidural steroid injection 2022,  he completed more than 6 weeks of  physician led exercise program/ home exercise in the last 6 months, not interested in participating in formal physical therapy, he had cervical epidural steroid injection which provided him with significant improvement in pain and functionality,  he has a only mild stiffness in his neck    Encounter Diagnosis:  Dimitrios Cazares is a 44 y.o. male with the following diagnoses based on history, exam, and imaging:  There are no diagnoses linked to this encounter.    Treatment Plan:    Diagnostics/Referrals:  HEP    Medications:    NSAIDs: Diclofenac  Topical Agent: NA  TCA/SSRI/SNRI: SSRI: Fluoxetine (Prozac)  Anti-convulsants:  continue gabapentin 300 mg BID,  patient was provided with a refill  Muscle Relaxants: None  Opioids: None    Interventional Therapy:   may consider repeating cervical epidural  Sedation: Oral Sedation.   Anticogualtion drugs: None-Clearance to stop Blood thinner: NA     Regarding the above interventions, the patient has been educated regarding the risks (including bleeding, infection, increased pain, nerve damage, or allergic reaction), benefits, and alternatives. The patient states he understands and is eager to proceed.    Physical Rehabilitation: Physician led exercise program and home exercise    Patient Education: Counseled patient regarding the importance of activity modification, I have stressed the importance of physical activity and a home exercise plan to help with pain and improve health.    Follow-up: RTC 6 months    May consider:  upper level cervical medial branch block    Corbin Pickering MD  Anesthesiologist  Interventional Pain Medicine  05/05/2025    Disclaimer:  This note was prepared using voice recognition system and is likely to have sound alike errors that may have been overlooked even after proof reading.  Please call me with any questions.

## 2025-05-12 ENCOUNTER — TELEPHONE (OUTPATIENT)
Dept: PHARMACY | Facility: CLINIC | Age: 45
End: 2025-05-12
Payer: COMMERCIAL

## 2025-05-12 NOTE — TELEPHONE ENCOUNTER
[facility]/Population Health Chart Review & Patient Outreach Details For Medication Adherence Project    Reason for Outreach Encounter: 3rd Party payor non-compliance report ([employer], [employer], C, etc)  2.  Patient Outreach Method: Reviewed Patient Chart  3.   Medication in question: lisinopril    LAST FILLED: 5/5/25 for 90 day supply  Hypertension Medications              amLODIPine (NORVASC) 2.5 MG tablet Take 1 tablet (2.5 mg total) by mouth once daily.    lisinopriL (PRINIVIL,ZESTRIL) 20 MG tablet Take 1 tablet (20 mg total) by mouth once daily.              4.  Reviewed and or Updates Made To: Patient Chart  5. Outreach Outcomes and/or actions taken: Patient filled medication and is on track to be adherent

## 2025-05-24 ENCOUNTER — TELEPHONE (OUTPATIENT)
Dept: PHARMACY | Facility: CLINIC | Age: 45
End: 2025-05-24
Payer: COMMERCIAL

## 2025-05-25 NOTE — TELEPHONE ENCOUNTER
[facility]Population [facility] Chart Review & Patient Outreach Details For Medication Adherence Project    Reason for Outreach Encounter: 3rd Party payor non-compliance report ([employer], [employer], [employer], etc)  2.  Patient Outreach Method: Reviewed patient chart   3.   Medication in question:    Hyperlipidemia Medications              rosuvastatin (CRESTOR) 20 MG tablet Take 1 tablet (20 mg total) by mouth every evening.                  rosuvastatin  last filled  4/14 for 90 day supply      4.  Reviewed and or Updates Made To: Patient Chart  5. Outreach Outcomes and/or actions taken: Patient filled medication and is on track to be adherent  Additional Notes:

## 2025-06-16 ENCOUNTER — CLINICAL SUPPORT (OUTPATIENT)
Dept: FAMILY MEDICINE | Facility: CLINIC | Age: 45
End: 2025-06-16
Payer: COMMERCIAL

## 2025-06-16 DIAGNOSIS — E55.9 VITAMIN D DEFICIENCY DISEASE: ICD-10-CM

## 2025-06-16 DIAGNOSIS — E11.9 TYPE 2 DIABETES MELLITUS WITHOUT COMPLICATION, WITHOUT LONG-TERM CURRENT USE OF INSULIN: ICD-10-CM

## 2025-06-16 LAB
25(OH)D3+25(OH)D2 SERPL-MCNC: 40 NG/ML (ref 30–96)
ALBUMIN SERPL BCP-MCNC: 4.1 G/DL (ref 3.5–5.2)
ALP SERPL-CCNC: 69 UNIT/L (ref 40–150)
ALT SERPL W/O P-5'-P-CCNC: 37 UNIT/L (ref 10–44)
ANION GAP (OHS): 6 MMOL/L (ref 8–16)
AST SERPL-CCNC: 21 UNIT/L (ref 11–45)
BILIRUB SERPL-MCNC: 0.4 MG/DL (ref 0.1–1)
BUN SERPL-MCNC: 17 MG/DL (ref 6–20)
CALCIUM SERPL-MCNC: 9.5 MG/DL (ref 8.7–10.5)
CHLORIDE SERPL-SCNC: 104 MMOL/L (ref 95–110)
CO2 SERPL-SCNC: 28 MMOL/L (ref 23–29)
CREAT SERPL-MCNC: 1.1 MG/DL (ref 0.5–1.4)
EAG (OHS): 134 MG/DL (ref 68–131)
GFR SERPLBLD CREATININE-BSD FMLA CKD-EPI: >60 ML/MIN/1.73/M2
GLUCOSE SERPL-MCNC: 115 MG/DL (ref 70–110)
HBA1C MFR BLD: 6.3 % (ref 4–5.6)
POTASSIUM SERPL-SCNC: 4.6 MMOL/L (ref 3.5–5.1)
PROT SERPL-MCNC: 7.2 GM/DL (ref 6–8.4)
SODIUM SERPL-SCNC: 138 MMOL/L (ref 136–145)

## 2025-06-16 PROCEDURE — 83036 HEMOGLOBIN GLYCOSYLATED A1C: CPT

## 2025-06-16 PROCEDURE — 80053 COMPREHEN METABOLIC PANEL: CPT

## 2025-06-16 PROCEDURE — 36415 COLL VENOUS BLD VENIPUNCTURE: CPT | Mod: S$GLB,,, | Performed by: FAMILY MEDICINE

## 2025-06-16 PROCEDURE — 82306 VITAMIN D 25 HYDROXY: CPT

## 2025-06-19 ENCOUNTER — OFFICE VISIT (OUTPATIENT)
Dept: FAMILY MEDICINE | Facility: CLINIC | Age: 45
End: 2025-06-19
Payer: COMMERCIAL

## 2025-06-19 VITALS
HEIGHT: 67 IN | BODY MASS INDEX: 32.24 KG/M2 | SYSTOLIC BLOOD PRESSURE: 112 MMHG | OXYGEN SATURATION: 96 % | HEART RATE: 82 BPM | RESPIRATION RATE: 17 BRPM | DIASTOLIC BLOOD PRESSURE: 72 MMHG | WEIGHT: 205.38 LBS

## 2025-06-19 DIAGNOSIS — E66.01 SEVERE OBESITY (BMI 35.0-39.9) WITH COMORBIDITY: ICD-10-CM

## 2025-06-19 DIAGNOSIS — G43.001 MIGRAINE WITHOUT AURA AND WITH STATUS MIGRAINOSUS, NOT INTRACTABLE: ICD-10-CM

## 2025-06-19 DIAGNOSIS — E11.9 TYPE 2 DIABETES MELLITUS WITHOUT COMPLICATION, WITHOUT LONG-TERM CURRENT USE OF INSULIN: ICD-10-CM

## 2025-06-19 DIAGNOSIS — E78.2 MIXED HYPERLIPIDEMIA: ICD-10-CM

## 2025-06-19 DIAGNOSIS — G43.909 ACUTE MIGRAINE: ICD-10-CM

## 2025-06-19 DIAGNOSIS — Z87.891 FORMER SMOKER: ICD-10-CM

## 2025-06-19 DIAGNOSIS — I10 ESSENTIAL HYPERTENSION: Primary | ICD-10-CM

## 2025-06-19 DIAGNOSIS — G47.33 OBSTRUCTIVE SLEEP APNEA (ADULT) (PEDIATRIC): ICD-10-CM

## 2025-06-19 DIAGNOSIS — F32.A DEPRESSION, UNSPECIFIED DEPRESSION TYPE: ICD-10-CM

## 2025-06-19 PROCEDURE — 4010F ACE/ARB THERAPY RXD/TAKEN: CPT | Mod: CPTII,S$GLB,, | Performed by: FAMILY MEDICINE

## 2025-06-19 PROCEDURE — 99214 OFFICE O/P EST MOD 30 MIN: CPT | Mod: S$GLB,,, | Performed by: FAMILY MEDICINE

## 2025-06-19 PROCEDURE — 3074F SYST BP LT 130 MM HG: CPT | Mod: CPTII,S$GLB,, | Performed by: FAMILY MEDICINE

## 2025-06-19 PROCEDURE — 99999 PR PBB SHADOW E&M-EST. PATIENT-LVL IV: CPT | Mod: PBBFAC,,, | Performed by: FAMILY MEDICINE

## 2025-06-19 PROCEDURE — 3061F NEG MICROALBUMINURIA REV: CPT | Mod: CPTII,S$GLB,, | Performed by: FAMILY MEDICINE

## 2025-06-19 PROCEDURE — 1159F MED LIST DOCD IN RCRD: CPT | Mod: CPTII,S$GLB,, | Performed by: FAMILY MEDICINE

## 2025-06-19 PROCEDURE — 1160F RVW MEDS BY RX/DR IN RCRD: CPT | Mod: CPTII,S$GLB,, | Performed by: FAMILY MEDICINE

## 2025-06-19 PROCEDURE — 3008F BODY MASS INDEX DOCD: CPT | Mod: CPTII,S$GLB,, | Performed by: FAMILY MEDICINE

## 2025-06-19 PROCEDURE — 3066F NEPHROPATHY DOC TX: CPT | Mod: CPTII,S$GLB,, | Performed by: FAMILY MEDICINE

## 2025-06-19 PROCEDURE — 3078F DIAST BP <80 MM HG: CPT | Mod: CPTII,S$GLB,, | Performed by: FAMILY MEDICINE

## 2025-06-19 PROCEDURE — 3044F HG A1C LEVEL LT 7.0%: CPT | Mod: CPTII,S$GLB,, | Performed by: FAMILY MEDICINE

## 2025-06-19 PROCEDURE — G2211 COMPLEX E/M VISIT ADD ON: HCPCS | Mod: S$GLB,,, | Performed by: FAMILY MEDICINE

## 2025-06-19 RX ORDER — TIRZEPATIDE 2.5 MG/.5ML
2.5 INJECTION, SOLUTION SUBCUTANEOUS
Qty: 2 ML | Refills: 5 | Status: SHIPPED | OUTPATIENT
Start: 2025-07-18 | End: 2026-01-14

## 2025-06-19 NOTE — PROGRESS NOTES
Subjective:       Patient ID: Dimitrios Cazares is a 44 y.o. male.    Chief Complaint: Follow-up (Pt here for 6 mth f/u. )    Pt is a 44 y.o. male who presents for evaluation and management of   Encounter Diagnoses   Name Primary?    Essential hypertension Yes    Depression, unspecified depression type     Migraine without aura and with status migrainosus, not intractable     Obstructive sleep apnea (adult) (pediatric)     Mixed hyperlipidemia     Severe obesity (BMI 35.0-39.9) with comorbidity     Acute migraine     Type 2 diabetes mellitus without complication, without long-term current use of insulin     Former smoker    .  Doing well on current meds. Denies any side effects. Prevention is up to date.  Review of Systems   Constitutional:  Negative for fever.   Respiratory:  Negative for shortness of breath.    Cardiovascular:  Negative for chest pain.   Gastrointestinal:  Negative for anal bleeding and blood in stool.   Genitourinary:  Negative for dysuria.   Neurological:  Negative for dizziness and light-headedness.       Objective:      Physical Exam  Constitutional:       Appearance: Normal appearance. He is well-developed. He is obese. He is not ill-appearing.   HENT:      Head: Normocephalic and atraumatic.      Right Ear: External ear normal.      Left Ear: External ear normal.      Nose: Nose normal.      Mouth/Throat:      Mouth: Mucous membranes are moist.      Pharynx: No oropharyngeal exudate or posterior oropharyngeal erythema.   Eyes:      General: No scleral icterus.        Right eye: No discharge.         Left eye: No discharge.      Conjunctiva/sclera: Conjunctivae normal.      Pupils: Pupils are equal, round, and reactive to light.   Neck:      Thyroid: No thyromegaly.      Vascular: No JVD.      Trachea: No tracheal deviation.   Cardiovascular:      Rate and Rhythm: Normal rate and regular rhythm.      Heart sounds: Normal heart sounds. No murmur heard.  Pulmonary:      Effort: Pulmonary  effort is normal. No respiratory distress.      Breath sounds: Normal breath sounds. No wheezing or rales.   Chest:      Chest wall: No tenderness.   Abdominal:      General: Bowel sounds are normal. There is no distension.      Palpations: Abdomen is soft. There is no mass.      Tenderness: There is no abdominal tenderness. There is no guarding or rebound.   Musculoskeletal:         General: Normal range of motion.      Cervical back: Normal range of motion and neck supple.      Right lower leg: No edema.      Left lower leg: No edema.   Lymphadenopathy:      Cervical: No cervical adenopathy.   Skin:     General: Skin is warm and dry.   Neurological:      Mental Status: He is alert and oriented to person, place, and time.      Cranial Nerves: No cranial nerve deficit.      Motor: No abnormal muscle tone.      Coordination: Coordination normal.      Deep Tendon Reflexes: Reflexes are normal and symmetric. Reflexes normal.   Psychiatric:         Behavior: Behavior normal.         Thought Content: Thought content normal.         Judgment: Judgment normal.         Assessment:       1. Essential hypertension    2. Depression, unspecified depression type    3. Migraine without aura and with status migrainosus, not intractable    4. Obstructive sleep apnea (adult) (pediatric)    5. Mixed hyperlipidemia    6. Severe obesity (BMI 35.0-39.9) with comorbidity    7. Acute migraine    8. Type 2 diabetes mellitus without complication, without long-term current use of insulin    9. Former smoker        Plan:   1. Essential hypertension  Overview:  Linsipril 20  Norvasc 2.5 ---stop due to orthostasis and recent weight loss.          2. Depression, unspecified depression type  Overview:  Mostly anger   Prozac increased to 40           3. Migraine without aura and with status migrainosus, not intractable  Overview:  Probably cluster h/a based on his symptoms (unilateral eye injection tearing).  TCA nightly   Ubrelvy prn          4.  Obstructive sleep apnea (adult) (pediatric)  Overview:  Has cpap which he uses nightly     Compliant with it and benefiting from it.       5. Mixed hyperlipidemia  Overview:  Lab Results   Component Value Date    LDLCALC 138.2 08/26/2024     LDL with work recenlty was 72   Crestor 20         6. Severe obesity (BMI 35.0-39.9) with comorbidity  Overview:  The patient is asked to make an attempt to improve diet and exercise patterns to aid in medical management of this problem.  Cut out white carbs: bread, rice, pasta, potatoes. Exercise/walk 5x/week for at least 30 minutes  .        7. Acute migraine    8. Type 2 diabetes mellitus without complication, without long-term current use of insulin  Overview:  Lab Results   Component Value Date    HGBA1C 6.3 (H) 06/16/2025     Mounjaro 2.5       Orders:  -     tirzepatide (MOUNJARO) 2.5 mg/0.5 mL PnIj; Inject 2.5 mg into the skin every 7 days.  Dispense: 2 mL; Refill: 5    9. Former smoker  Overview:  Quit April 8th 2025           No follow-ups on file.

## 2025-07-18 DIAGNOSIS — E78.2 MIXED HYPERLIPIDEMIA: ICD-10-CM

## 2025-07-18 DIAGNOSIS — E11.9 TYPE 2 DIABETES MELLITUS WITHOUT COMPLICATION, WITHOUT LONG-TERM CURRENT USE OF INSULIN: ICD-10-CM

## 2025-07-18 RX ORDER — ROSUVASTATIN CALCIUM 20 MG/1
20 TABLET, COATED ORAL NIGHTLY
Qty: 90 TABLET | Refills: 0 | Status: SHIPPED | OUTPATIENT
Start: 2025-07-18

## 2025-07-18 NOTE — TELEPHONE ENCOUNTER
Care Due:                  Date            Visit Type   Department     Provider  --------------------------------------------------------------------------------                                EP -                              [facility]  Last Visit: 06-      CARE ([facility])   [facility] Vera                               -                              [facility]  Next Visit: 01-      CARE ([facility])   [facility] Vera                                                            Last  Test          Frequency    Reason                     Performed    Due Date  --------------------------------------------------------------------------------    CBC.........  12 months..  diclofenac...............  08- 08-    Lipid Panel.  12 months..  rosuvastatin.............  08- 08-    [employer] Embedded Care Due Messages. Reference number: [id number].   7/18/2025 10:31:45 AM CDT

## 2025-07-18 NOTE — TELEPHONE ENCOUNTER
Refill Decision Note   Dimitrios Sage  is requesting a refill authorization.  Brief Assessment and Rationale for Refill:  Approve     Medication Therapy Plan:  FLOS; TALITAS      Comments:     Note composed:11:26 AM 07/18/2025

## 2025-07-25 ENCOUNTER — PATIENT MESSAGE (OUTPATIENT)
Dept: FAMILY MEDICINE | Facility: CLINIC | Age: 45
End: 2025-07-25
Payer: COMMERCIAL

## 2025-07-28 RX ORDER — IBUPROFEN 800 MG/1
800 TABLET, FILM COATED ORAL 3 TIMES DAILY
Qty: 90 TABLET | Refills: 2 | Status: SHIPPED | OUTPATIENT
Start: 2025-07-28

## 2025-07-28 NOTE — TELEPHONE ENCOUNTER
No care due was identified.  [facility] Embedded Care Due Messages. Reference number: [id number].   7/28/2025 9:07:27 AM CDT

## 2025-07-28 NOTE — TELEPHONE ENCOUNTER
Pt requesting medication refill. Please advise if refill can be given.   LOV:  06/19/25                     Pharm: [facility] - [place], [place] - [address]  Med pended. Thanks   Requested Prescriptions     Pending Prescriptions Disp Refills    ibuprofen (ADVIL,MOTRIN) 800 MG tablet       Sig: Take 1 tablet (800 mg total) by mouth.

## 2025-08-03 ENCOUNTER — TELEPHONE (OUTPATIENT)
Dept: PHARMACY | Facility: CLINIC | Age: 45
End: 2025-08-03
Payer: COMMERCIAL

## 2025-08-03 NOTE — TELEPHONE ENCOUNTER
[facility]/Population Health Chart Review & Patient Outreach Details For Medication Adherence Project    Reason for Outreach Encounter: 3rd Party payor non-compliance report ([employer], [employer], [employer], etc)  2.  Patient Outreach Method: Reviewed patient chart   3.   Medication in question:    Hyperlipidemia Medications              rosuvastatin (CRESTOR) 20 MG tablet TAKE 1 TABLET BY MOUTH EVERY EVENING.               LF 90 ds 7/18/25    4.  Reviewed and or Updates Made To: Patient Chart  5. Outreach Outcomes and/or actions taken: Patient filled medication and is on track to be adherent  Additional Notes:

## 2025-08-07 DIAGNOSIS — I10 ESSENTIAL HYPERTENSION: ICD-10-CM

## 2025-08-07 RX ORDER — LISINOPRIL 20 MG/1
20 TABLET ORAL DAILY
Qty: 90 TABLET | Refills: 3 | Status: SHIPPED | OUTPATIENT
Start: 2025-08-07

## 2025-08-07 NOTE — TELEPHONE ENCOUNTER
Refill Decision Note   Dimitrios Sage  is requesting a refill authorization.  Brief Assessment and Rationale for Refill:  Approve     Medication Therapy Plan:        Comments:     Note composed:7:13 AM 08/07/2025

## 2025-09-05 DIAGNOSIS — R11.0 NAUSEA: ICD-10-CM

## 2025-09-05 RX ORDER — PANTOPRAZOLE SODIUM 40 MG/1
40 TABLET, DELAYED RELEASE ORAL DAILY
Qty: 90 TABLET | Refills: 3 | Status: SHIPPED | OUTPATIENT
Start: 2025-09-05

## (undated) DEVICE — MARKER SKIN RULER AND LABEL

## (undated) DEVICE — CHLORAPREP 10.5 ML APPLICATOR

## (undated) DEVICE — KIT NERVE BLOCK PREP BAPTIST